# Patient Record
Sex: FEMALE | Race: WHITE | NOT HISPANIC OR LATINO | URBAN - METROPOLITAN AREA
[De-identification: names, ages, dates, MRNs, and addresses within clinical notes are randomized per-mention and may not be internally consistent; named-entity substitution may affect disease eponyms.]

---

## 2017-01-18 RX ORDER — TOPIRAMATE 50 MG/1
TABLET, FILM COATED ORAL
Qty: 180 TABLET | Refills: 0 | Status: SHIPPED | OUTPATIENT
Start: 2017-01-18 | End: 2017-04-03 | Stop reason: SDUPTHER

## 2017-04-03 RX ORDER — TOPIRAMATE 50 MG/1
TABLET, FILM COATED ORAL
Qty: 180 TABLET | Refills: 5 | Status: SHIPPED | OUTPATIENT
Start: 2017-04-03 | End: 2020-08-21

## 2017-09-01 DIAGNOSIS — R56.9 SEIZURES (HCC): ICD-10-CM

## 2017-09-01 DIAGNOSIS — G43.719 INTRACTABLE CHRONIC MIGRAINE WITHOUT AURA AND WITHOUT STATUS MIGRAINOSUS: Primary | ICD-10-CM

## 2024-01-03 ENCOUNTER — HOSPITAL ENCOUNTER (EMERGENCY)
Facility: HOSPITAL | Age: 64
Discharge: HOME | End: 2024-01-03
Attending: EMERGENCY MEDICINE | Admitting: EMERGENCY MEDICINE
Payer: COMMERCIAL

## 2024-01-03 VITALS
TEMPERATURE: 98.4 F | HEART RATE: 99 BPM | RESPIRATION RATE: 18 BRPM | SYSTOLIC BLOOD PRESSURE: 137 MMHG | HEIGHT: 63 IN | OXYGEN SATURATION: 95 % | BODY MASS INDEX: 26.58 KG/M2 | DIASTOLIC BLOOD PRESSURE: 63 MMHG | WEIGHT: 150 LBS

## 2024-01-03 DIAGNOSIS — J02.0 STREP PHARYNGITIS: Primary | ICD-10-CM

## 2024-01-03 LAB
ALBUMIN SERPL-MCNC: 3.8 G/DL (ref 3.5–5.2)
ALBUMIN/GLOB SERPL: 1.6 G/DL
ALP SERPL-CCNC: 91 U/L (ref 39–117)
ALT SERPL W P-5'-P-CCNC: 12 U/L (ref 1–33)
ANION GAP SERPL CALCULATED.3IONS-SCNC: 8.4 MMOL/L (ref 5–15)
AST SERPL-CCNC: 16 U/L (ref 1–32)
BASOPHILS # BLD AUTO: 0.03 10*3/MM3 (ref 0–0.2)
BASOPHILS NFR BLD AUTO: 0.3 % (ref 0–1.5)
BILIRUB SERPL-MCNC: 0.4 MG/DL (ref 0–1.2)
BILIRUB UR QL STRIP: NEGATIVE
BUN SERPL-MCNC: 6 MG/DL (ref 8–23)
BUN/CREAT SERPL: 8.2 (ref 7–25)
CALCIUM SPEC-SCNC: 9 MG/DL (ref 8.6–10.5)
CHLORIDE SERPL-SCNC: 106 MMOL/L (ref 98–107)
CLARITY UR: ABNORMAL
CO2 SERPL-SCNC: 22.6 MMOL/L (ref 22–29)
COLOR UR: YELLOW
CREAT SERPL-MCNC: 0.73 MG/DL (ref 0.57–1)
D-LACTATE SERPL-SCNC: 0.7 MMOL/L (ref 0.5–2)
DEPRECATED RDW RBC AUTO: 51.2 FL (ref 37–54)
EGFRCR SERPLBLD CKD-EPI 2021: 93.1 ML/MIN/1.73
EOSINOPHIL # BLD AUTO: 0.06 10*3/MM3 (ref 0–0.4)
EOSINOPHIL NFR BLD AUTO: 0.6 % (ref 0.3–6.2)
ERYTHROCYTE [DISTWIDTH] IN BLOOD BY AUTOMATED COUNT: 17.4 % (ref 12.3–15.4)
GLOBULIN UR ELPH-MCNC: 2.4 GM/DL
GLUCOSE SERPL-MCNC: 102 MG/DL (ref 65–99)
GLUCOSE UR STRIP-MCNC: NEGATIVE MG/DL
HCT VFR BLD AUTO: 37.7 % (ref 34–46.6)
HGB BLD-MCNC: 11.7 G/DL (ref 12–15.9)
HGB UR QL STRIP.AUTO: NEGATIVE
HOLD SPECIMEN: NORMAL
HOLD SPECIMEN: NORMAL
IMM GRANULOCYTES # BLD AUTO: 0.07 10*3/MM3 (ref 0–0.05)
IMM GRANULOCYTES NFR BLD AUTO: 0.7 % (ref 0–0.5)
KETONES UR QL STRIP: NEGATIVE
LEUKOCYTE ESTERASE UR QL STRIP.AUTO: NEGATIVE
LIPASE SERPL-CCNC: 25 U/L (ref 13–60)
LYMPHOCYTES # BLD AUTO: 1.14 10*3/MM3 (ref 0.7–3.1)
LYMPHOCYTES NFR BLD AUTO: 11.9 % (ref 19.6–45.3)
MCH RBC QN AUTO: 25 PG (ref 26.6–33)
MCHC RBC AUTO-ENTMCNC: 31 G/DL (ref 31.5–35.7)
MCV RBC AUTO: 80.6 FL (ref 79–97)
MONOCYTES # BLD AUTO: 0.63 10*3/MM3 (ref 0.1–0.9)
MONOCYTES NFR BLD AUTO: 6.6 % (ref 5–12)
NEUTROPHILS NFR BLD AUTO: 7.66 10*3/MM3 (ref 1.7–7)
NEUTROPHILS NFR BLD AUTO: 79.9 % (ref 42.7–76)
NITRITE UR QL STRIP: NEGATIVE
NRBC BLD AUTO-RTO: 0 /100 WBC (ref 0–0.2)
PH UR STRIP.AUTO: 6 [PH] (ref 4.5–8)
PLATELET # BLD AUTO: 310 10*3/MM3 (ref 140–450)
PMV BLD AUTO: 9.5 FL (ref 6–12)
POTASSIUM SERPL-SCNC: 3.9 MMOL/L (ref 3.5–5.2)
PROT SERPL-MCNC: 6.2 G/DL (ref 6–8.5)
PROT UR QL STRIP: NEGATIVE
RBC # BLD AUTO: 4.68 10*6/MM3 (ref 3.77–5.28)
S PYO AG THROAT QL: POSITIVE
SODIUM SERPL-SCNC: 137 MMOL/L (ref 136–145)
SP GR UR STRIP: 1.02 (ref 1–1.03)
UROBILINOGEN UR QL STRIP: ABNORMAL
WBC NRBC COR # BLD AUTO: 9.59 10*3/MM3 (ref 3.4–10.8)
WHOLE BLOOD HOLD COAG: NORMAL
WHOLE BLOOD HOLD SPECIMEN: NORMAL

## 2024-01-03 PROCEDURE — 99283 EMERGENCY DEPT VISIT LOW MDM: CPT

## 2024-01-03 PROCEDURE — 25810000003 SODIUM CHLORIDE 0.9 % SOLUTION: Performed by: EMERGENCY MEDICINE

## 2024-01-03 PROCEDURE — 81003 URINALYSIS AUTO W/O SCOPE: CPT

## 2024-01-03 PROCEDURE — 80053 COMPREHEN METABOLIC PANEL: CPT | Performed by: EMERGENCY MEDICINE

## 2024-01-03 PROCEDURE — 83605 ASSAY OF LACTIC ACID: CPT | Performed by: EMERGENCY MEDICINE

## 2024-01-03 PROCEDURE — 85025 COMPLETE CBC W/AUTO DIFF WBC: CPT

## 2024-01-03 PROCEDURE — 96360 HYDRATION IV INFUSION INIT: CPT

## 2024-01-03 PROCEDURE — 87880 STREP A ASSAY W/OPTIC: CPT | Performed by: EMERGENCY MEDICINE

## 2024-01-03 PROCEDURE — 25010000002 PENICILLIN G BENZATHINE PER 1200000 UNITS: Performed by: EMERGENCY MEDICINE

## 2024-01-03 PROCEDURE — 96361 HYDRATE IV INFUSION ADD-ON: CPT

## 2024-01-03 PROCEDURE — 96372 THER/PROPH/DIAG INJ SC/IM: CPT

## 2024-01-03 PROCEDURE — 83690 ASSAY OF LIPASE: CPT | Performed by: EMERGENCY MEDICINE

## 2024-01-03 RX ORDER — SODIUM CHLORIDE 0.9 % (FLUSH) 0.9 %
10 SYRINGE (ML) INJECTION AS NEEDED
Status: DISCONTINUED | OUTPATIENT
Start: 2024-01-03 | End: 2024-01-04 | Stop reason: HOSPADM

## 2024-01-03 RX ADMIN — SODIUM CHLORIDE 1000 ML: 9 INJECTION, SOLUTION INTRAVENOUS at 19:25

## 2024-01-03 RX ADMIN — PENICILLIN G BENZATHINE 1.2 MILLION UNITS: 1200000 INJECTION, SUSPENSION INTRAMUSCULAR at 21:20

## 2024-01-04 NOTE — DISCHARGE INSTRUCTIONS
Rest.  Drink plenty of fluids.  Follow-up with Brittany Obrein at week.  Return to the emergency department if there is worsening pain, bloody stools, shortness of breath, difficulty swallowing or speaking, worse in any way at all.

## 2024-01-04 NOTE — ED PROVIDER NOTES
Subjective   History of Present Illness    Chief complaint: Fever chills and diarrhea sore throat    Location: Home    Quality/Severity: Nonbloody    Timing/Onset/Duration: Christmas    Modifying Factors: Patient thinks overall she is getting better but she feels dehydrated    Associated Symptoms: Patient plaints of headache.  Patient's had fever and chills.  No cough.  Patient's had sore throat.  No earache or nasal congestion.  No chest pain or shortness of breath.  She had been having lower abdominal pain but she is not having that now.  No blood in the diarrhea.  No nausea or vomiting.    Narrative: This 63-year-old presents with abdominal pain that started on Justyn Day.  Patient presents with sore throat and diarrhea.  She has been to PCP several times with x-ray, stool cultures and blood work with no source of infection.  COVID flu on 27 December 2023 was negative.  Do not have access to the stool studies or the KUB results that were apparently done in the [facility] system.    PCP:Brittany Obrien MD      Review of Systems   Constitutional:  Positive for chills and fever (Subjective).   HENT:  Positive for sore throat. Negative for congestion and ear pain.    Respiratory:  Negative for cough.    Cardiovascular:  Negative for chest pain.   Gastrointestinal:  Positive for diarrhea. Negative for abdominal pain, nausea and vomiting.   Genitourinary:  Negative for difficulty urinating.   Musculoskeletal:  Negative for back pain.   Skin:  Negative for rash.   Neurological:  Positive for headaches.         Past Medical History:   Diagnosis Date   • Depression    • Diabetes mellitus, type II    • History of PCOS    • History of shingles 2005   • Hyperlipidemia    • Hypertension    • Migraine    • Pap smear of cervix with ASCUS, cannot exclude HGSIL 11/05/2007 11/5/07; 1/8/09; 7/9/09       Allergies   Allergen Reactions   • Bupropion Other (See Comments) and Diarrhea       Past Surgical History:   Procedure Laterality  Date   • D & C HYSTEROSCOPY ENDOMETRIAL ABLATION  06/01/2010    W/KATHIE. DR[facility] DAVID HERNDON/[facility].   • ENDOMETRIAL BIOPSY  12/03/2007    BENIGN   • GASTROTOMY  05/2004   • TONSILLECTOMY  1967   • TUBAL ABDOMINAL LIGATION  1986       Family History   Problem Relation Age of Onset   • Breast cancer Mother 72   • Stroke Mother    • Hypertension Mother    • Heart disease Mother    • No Known Problems Father        Social History     Socioeconomic History   • Marital status:      Spouse name: GOSIA   • Number of children: 2   Tobacco Use   • Smoking status: Never   • Smokeless tobacco: Never   Vaping Use   • Vaping Use: Never used   Substance and Sexual Activity   • Alcohol use: No   • Drug use: No   • Sexual activity: Yes     Partners: Male     Birth control/protection: Surgical     Comment: BTL 1986           Objective   Physical Exam  Vitals (Temperature 98.4 °F, pulse 100, respirations 20, /82, room air pulse ox 98%.) and nursing note reviewed.   Constitutional:       Appearance: She is well-developed.   HENT:      Head: Normocephalic and atraumatic.      Mouth/Throat:      Comments: Mucous membranes dry  Cardiovascular:      Rate and Rhythm: Normal rate and regular rhythm.      Heart sounds: Murmur heard.      No friction rub. No gallop.   Pulmonary:      Effort: Pulmonary effort is normal.      Breath sounds: Normal breath sounds.   Abdominal:      General: Abdomen is flat. Bowel sounds are normal.      Palpations: Abdomen is soft. There is no mass.      Tenderness: There is no abdominal tenderness. There is no guarding or rebound.      Hernia: No hernia is present.   Skin:     General: Skin is warm and dry.      Findings: No rash.   Neurological:      General: No focal deficit present.      Mental Status: She is alert and oriented to person, place, and time.         Procedures           ED Course  ED Course as of 01/04/24 0014   Wed Jan 03, 2024   1904 Urinalysis is slightly cloudy, is unremarkable. [RC]    1905 The hemoglobin is 11.7, neutrophil percent 79%, absolute neutrophil count 7.6, CBC otherwise unremarkable. [RC]   1905 The hemoglobin was 10.7 yesterday, hemoglobin is actually better today. [RC]   1911 Urinalysis is slightly cloudy, otherwise unremarkable. [RC]   2059 Patient strep a is positive. [RC]   2100 The glucose  is 102, the CMP is otherwise unremarkable. [RC]   2100 Lipase is 25 and normal. [RC]   2100 Lactic acid is 0.7 and normal. [RC]      ED Course User Index  [RC] Satish Yu MD      21:06 EST, 01/03/24:  The patient was reassessed.  She feels much better.  Her vital signs reviewed and are stable.  Exam: Soft, nontender, no masses, positive bowel sounds.    21:06 EST, 01/03/24:  Patient's diagnosis of strep pharyngitis and enteritis was discussed with her.  She should rest.  She should drink plenty of fluids.  We will give her an IM injection of penicillin here in the emergency department.  Patient should follow-up with Brittany Obrien in the office next week.  She return to the emergency department if there is bloody stools, worsening pain, shortness of breath, difficulty swallowing or speaking, worse in any way at all.  All the patient questions were answered she will be discharged in good condition.                                       Medical Decision Making  Amount and/or Complexity of Data Reviewed  Labs: ordered.    Risk  Prescription drug management.        Final diagnoses:   Strep pharyngitis       ED Disposition  ED Disposition       None            No follow-up provider specified.       Medication List      No changes were made to your prescriptions during this visit.            Satish Yu MD  01/04/24 0014

## 2024-01-09 ENCOUNTER — HOSPITAL ENCOUNTER (EMERGENCY)
Facility: HOSPITAL | Age: 64
Discharge: HOME | End: 2024-01-09
Attending: EMERGENCY MEDICINE | Admitting: EMERGENCY MEDICINE
Payer: COMMERCIAL

## 2024-01-09 VITALS
SYSTOLIC BLOOD PRESSURE: 142 MMHG | TEMPERATURE: 97.7 F | RESPIRATION RATE: 20 BRPM | DIASTOLIC BLOOD PRESSURE: 88 MMHG | WEIGHT: 150 LBS | OXYGEN SATURATION: 99 % | HEART RATE: 80 BPM | HEIGHT: 63 IN | BODY MASS INDEX: 26.58 KG/M2

## 2024-01-09 DIAGNOSIS — A04.5 CAMPYLOBACTER DIARRHEA: Primary | ICD-10-CM

## 2024-01-09 LAB
ALBUMIN SERPL-MCNC: 3.9 G/DL (ref 3.5–5.2)
ALBUMIN/GLOB SERPL: 1.3 G/DL
ALP SERPL-CCNC: 85 U/L (ref 39–117)
ALT SERPL W P-5'-P-CCNC: 15 U/L (ref 1–33)
ANION GAP SERPL CALCULATED.3IONS-SCNC: 9.3 MMOL/L (ref 5–15)
AST SERPL-CCNC: 24 U/L (ref 1–32)
BASOPHILS # BLD AUTO: 0.06 10*3/MM3 (ref 0–0.2)
BASOPHILS NFR BLD AUTO: 1.2 % (ref 0–1.5)
BILIRUB SERPL-MCNC: 0.3 MG/DL (ref 0–1.2)
BILIRUB UR QL STRIP: NEGATIVE
BUN SERPL-MCNC: 4 MG/DL (ref 8–23)
BUN/CREAT SERPL: 4.6 (ref 7–25)
CALCIUM SPEC-SCNC: 9.5 MG/DL (ref 8.6–10.5)
CHLORIDE SERPL-SCNC: 104 MMOL/L (ref 98–107)
CLARITY UR: CLEAR
CO2 SERPL-SCNC: 20.7 MMOL/L (ref 22–29)
COLOR UR: NORMAL
CREAT SERPL-MCNC: 0.87 MG/DL (ref 0.57–1)
DEPRECATED RDW RBC AUTO: 49.9 FL (ref 37–54)
EGFRCR SERPLBLD CKD-EPI 2021: 75.4 ML/MIN/1.73
EOSINOPHIL # BLD AUTO: 0.08 10*3/MM3 (ref 0–0.4)
EOSINOPHIL NFR BLD AUTO: 1.5 % (ref 0.3–6.2)
ERYTHROCYTE [DISTWIDTH] IN BLOOD BY AUTOMATED COUNT: 17.1 % (ref 12.3–15.4)
GLOBULIN UR ELPH-MCNC: 2.9 GM/DL
GLUCOSE SERPL-MCNC: 79 MG/DL (ref 65–99)
GLUCOSE UR STRIP-MCNC: NEGATIVE MG/DL
HCT VFR BLD AUTO: 40.8 % (ref 34–46.6)
HGB BLD-MCNC: 12.5 G/DL (ref 12–15.9)
HGB UR QL STRIP.AUTO: NEGATIVE
IMM GRANULOCYTES # BLD AUTO: 0.06 10*3/MM3 (ref 0–0.05)
IMM GRANULOCYTES NFR BLD AUTO: 1.2 % (ref 0–0.5)
KETONES UR QL STRIP: NEGATIVE
LEUKOCYTE ESTERASE UR QL STRIP.AUTO: NEGATIVE
LYMPHOCYTES # BLD AUTO: 2.13 10*3/MM3 (ref 0.7–3.1)
LYMPHOCYTES NFR BLD AUTO: 41 % (ref 19.6–45.3)
MCH RBC QN AUTO: 24.6 PG (ref 26.6–33)
MCHC RBC AUTO-ENTMCNC: 30.6 G/DL (ref 31.5–35.7)
MCV RBC AUTO: 80.2 FL (ref 79–97)
MONOCYTES # BLD AUTO: 0.45 10*3/MM3 (ref 0.1–0.9)
MONOCYTES NFR BLD AUTO: 8.7 % (ref 5–12)
NEUTROPHILS NFR BLD AUTO: 2.42 10*3/MM3 (ref 1.7–7)
NEUTROPHILS NFR BLD AUTO: 46.4 % (ref 42.7–76)
NITRITE UR QL STRIP: NEGATIVE
NRBC BLD AUTO-RTO: 0 /100 WBC (ref 0–0.2)
PH UR STRIP.AUTO: 5.5 [PH] (ref 4.5–8)
PLATELET # BLD AUTO: 411 10*3/MM3 (ref 140–450)
PMV BLD AUTO: 10.2 FL (ref 6–12)
POTASSIUM SERPL-SCNC: 3.3 MMOL/L (ref 3.5–5.2)
PROT SERPL-MCNC: 6.8 G/DL (ref 6–8.5)
PROT UR QL STRIP: NEGATIVE
RBC # BLD AUTO: 5.09 10*6/MM3 (ref 3.77–5.28)
SODIUM SERPL-SCNC: 134 MMOL/L (ref 136–145)
SP GR UR STRIP: <=1.005 (ref 1–1.03)
UROBILINOGEN UR QL STRIP: NORMAL
WBC NRBC COR # BLD AUTO: 5.2 10*3/MM3 (ref 3.4–10.8)

## 2024-01-09 PROCEDURE — 80053 COMPREHEN METABOLIC PANEL: CPT

## 2024-01-09 PROCEDURE — 99283 EMERGENCY DEPT VISIT LOW MDM: CPT

## 2024-01-09 PROCEDURE — 81003 URINALYSIS AUTO W/O SCOPE: CPT

## 2024-01-09 PROCEDURE — 96360 HYDRATION IV INFUSION INIT: CPT

## 2024-01-09 PROCEDURE — 25810000003 SODIUM CHLORIDE 0.9 % SOLUTION

## 2024-01-09 PROCEDURE — 85025 COMPLETE CBC W/AUTO DIFF WBC: CPT

## 2024-01-09 RX ORDER — POTASSIUM CHLORIDE 20 MEQ/1
40 TABLET, EXTENDED RELEASE ORAL DAILY
Status: DISCONTINUED | OUTPATIENT
Start: 2024-01-09 | End: 2024-01-09 | Stop reason: HOSPADM

## 2024-01-09 RX ORDER — ONDANSETRON 4 MG/1
8 TABLET, ORALLY DISINTEGRATING ORAL EVERY 8 HOURS PRN
Qty: 12 TABLET | Refills: 0 | Status: SHIPPED | OUTPATIENT
Start: 2024-01-09

## 2024-01-09 RX ORDER — SODIUM CHLORIDE 0.9 % (FLUSH) 0.9 %
10 SYRINGE (ML) INJECTION AS NEEDED
Status: DISCONTINUED | OUTPATIENT
Start: 2024-01-09 | End: 2024-01-09 | Stop reason: HOSPADM

## 2024-01-09 RX ORDER — AZITHROMYCIN 500 MG/1
500 TABLET, FILM COATED ORAL DAILY
Qty: 3 TABLET | Refills: 0 | Status: SHIPPED | OUTPATIENT
Start: 2024-01-09 | End: 2024-01-12

## 2024-01-09 RX ADMIN — POTASSIUM CHLORIDE 40 MEQ: 1500 TABLET, EXTENDED RELEASE ORAL at 17:51

## 2024-01-09 RX ADMIN — SODIUM CHLORIDE 1000 ML: 9 INJECTION, SOLUTION INTRAVENOUS at 16:25

## 2024-01-09 NOTE — DISCHARGE INSTRUCTIONS
Stop ciprofloxacin.  Start azithromycin 500 mg once daily x 3 days.  Also recommend taking a probiotic and vitamin.  Continue use of Imodium 2 mg 4 times daily.  Increase electrolyte fluids and small bland meals.  Trial adding a bulking agent, such as Metamucil.  Follow-up with your primary care and/or GI for ongoing evaluation management.  I have sent in a GI referral.  I have attached the phone number to the [facility] office.  Return to emergency department for worsening symptoms or other medical emergencies.  Refer to the attached instructions for further information.

## 2024-01-09 NOTE — Clinical Note
[facility]  [facility] EMERGENCY DEPARTMENT  [address] [place] [address]  Phone: [phone]    Emma Villavicencio was seen and treated in our emergency department on 1/9/2024.  She may return to work on 01/15/2024.         Thank you for choosing [facility].    Regi Bailon, ROLO

## 2024-01-09 NOTE — ED NOTES
Patient states she has been having multiple episodes of diarrhea since December, patient states that the diarrhea has gotten worse and more frequent. Patient denies abd pain, pt denies any bloody stools. Patient states that diarrhea occurs more often after meals. Patient placed in hospital gown, patient belongings in bag, patient has call light within reach. PA at the bedside to evaluate patient and discuss plan of care

## 2024-01-09 NOTE — Clinical Note
RUTH CREWS  [facility] EMERGENCY DEPARTMENT  [address] [place] [address]  Phone: [phone]    Emma Villavicencio was seen and treated in our emergency department on 1/9/2024.  She may return to work on 01/15/2024.         Thank you for choosing [facility].    Regi Bailon, ROLO

## 2024-01-09 NOTE — ED PROVIDER NOTES
"Subjective   History of Present Illness  Patient is a 63-year-old female who presents to the emergency department for diarrhea x 2 weeks.  Patient reports fever at the onset of her symptoms, but none in the past week.  Denies bloody diarrhea, abdominal pain, nausea, vomiting.  Patient has been previously seen by her primary care.  Had a stool culture which revealed Campylobacter bacteria.  Was put on ciprofloxacin 500 twice daily for 10 days, which she is still taking.  Patient reports it has not helped.  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  Reports she has had very little to eat yesterday and today.  Was also recently diagnosed with strep throat, and given single IM injection in this ER x 6 days ago.        Review of Systems   Constitutional:  Positive for appetite change. Negative for chills, diaphoresis, fatigue and fever.   Respiratory:  Negative for cough and shortness of breath.    Cardiovascular:  Negative for chest pain.   Gastrointestinal:  Positive for diarrhea. Negative for abdominal pain, anal bleeding, blood in stool, constipation, nausea and vomiting.   Genitourinary:  Negative for difficulty urinating and dysuria.   Skin:  Negative for color change, pallor, rash and wound.   Neurological:  Negative for dizziness, seizures, syncope, weakness and light-headedness.   Psychiatric/Behavioral:  Negative for confusion.        Past Medical History:   Diagnosis Date    Depression     Diabetes mellitus, type II     History of PCOS     History of shingles 2005    Hyperlipidemia     Hypertension     Migraine     Pap smear of cervix with ASCUS, cannot exclude HGSIL 11/05/2007 11/5/07; 1/8/09; 7/9/09       Allergies   Allergen Reactions    Bupropion Other (See Comments) and Diarrhea       Past Surgical History:   Procedure Laterality Date    D & C HYSTEROSCOPY ENDOMETRIAL ABLATION  06/01/2010    W/KATHIE. DR. MOLINA. " [facility].    ENDOMETRIAL BIOPSY  12/03/2007    BENIGN    GASTROTOMY  05/2004    TONSILLECTOMY  1967    TUBAL ABDOMINAL LIGATION  1986       Family History   Problem Relation Age of Onset    Breast cancer Mother 72    Stroke Mother     Hypertension Mother     Heart disease Mother     No Known Problems Father        Social History     Socioeconomic History    Marital status:      Spouse name: GOSIA    Number of children: 2   Tobacco Use    Smoking status: Never    Smokeless tobacco: Never   Vaping Use    Vaping Use: Never used   Substance and Sexual Activity    Alcohol use: No    Drug use: No    Sexual activity: Yes     Partners: Male     Birth control/protection: Surgical     Comment: BTL 1986           Objective   Physical Exam  Vitals and nursing note reviewed.   Constitutional:       General: She is not in acute distress.     Appearance: She is normal weight. She is not ill-appearing, toxic-appearing or diaphoretic.   Pulmonary:      Effort: Pulmonary effort is normal. No respiratory distress.   Abdominal:      General: Abdomen is flat. There is no distension.      Palpations: Abdomen is soft. There is no mass.      Tenderness: There is no abdominal tenderness. There is no guarding or rebound.      Hernia: No hernia is present.   Skin:     General: Skin is warm and dry.   Neurological:      General: No focal deficit present.      Mental Status: She is alert.   Psychiatric:         Mood and Affect: Mood normal.         Behavior: Behavior normal.         Procedures           ED Course  ED Course as of 01/09/24 1756 Tue Jan 09, 2024   1742 WBC: 5.20 [AS]   1742 Neutrophil Rel %: 46.4 [AS]   1742 Neutrophils Absolute: 2.42 [AS]   1742 Hemoglobin: 12.5 [AS]   1742 Hematocrit: 40.8 [AS]   1742 MCV: 80.2 [AS]   1742 Glucose: 79 [AS]   1742 BUN(!): 4 [AS]   1742 Creatinine: 0.87 [AS]   1742 Sodium(!): 134 [AS]   1742 Potassium(!): 3.3 [AS]   1742 Chloride: 104 [AS]   1742 CO2(!): 20.7 [AS]   1742 ALT (SGPT): 15  [AS]   1742 AST (SGOT): 24 [AS]   1742 Alkaline Phosphatase: 85 [AS]   1742 BUN/Creatinine Ratio(!): 4.6 [AS]   1742 Anion Gap: 9.3 [AS]   1742 eGFR: 75.4 [AS]   1742 Clear urinalysis. [AS]   1742 BP: 143/84 [AS]   1742 Temp: 97.7 °F (36.5 °C) [AS]   1742 Heart Rate: 83 [AS]   1743 Resp: 20 [AS]   1743 SpO2: 99 % [AS]      ED Course User Index  [AS] Regi Bailon PA-C                                             Medical Decision Making  Patient is a 63-year-old female who presents for diarrhea x 2 weeks.  Known diagnosis of Campylobacter.  Started on ciprofloxacin by PCP.  These were both through outside facility.  Reviewed the records on patient's phone with her permission.  Patient overall appears well, no acute distress, nontoxic.  Vital signs are WNL.  Afebrile.  Abdomen soft, nontender.  Exam is unremarkable.  Patient's lab work reveals mild hyponatremia and hypokalemia.  Potassium given.  Patient taking multivitamin at home.  Remaining lab work unremarkable.  No white count.  Normal kidney function.  Urinalysis clear.  Patient has some chronic concerns about anemia and bleeding from several months ago.  Lab work is stable today.  Patient instructed to follow-up with PCP.  Patient likely on ciprofloxacin at too low of a dose, or with resistance.  Not known if susceptibility report was completed.  Will try azithromycin.  Discussed risk versus benefit of antibiotics, as they could be worsening her symptoms, and typically this is a self-limited condition especially when mild without bloody diarrhea, abdominal pain, or fever.  However, her symptoms have been going on for 2 weeks, and she would like to try azithromycin.  She verbalized understanding.  Referral placed for GI follow-up.  Discussed when to return to the emergency department.  Answered all questions.  Patient verbalized understanding and was agreeable to plan and discharge.  My differential diagnosis includes but is not limited to: Infectious diarrhea,  viral diarrhea, dehydration, electrolyte abnormality, PHILIP, ARF, IBS, diverticulitis, UTI, cystitis, gastroenteritis, gastritis, hepatitis, pancreatitis    Problems Addressed:  Campylobacter diarrhea: complicated acute illness or injury    Amount and/or Complexity of Data Reviewed  Labs: ordered.    Risk  Prescription drug management.        Final diagnoses:   Campylobacter diarrhea       ED Disposition  ED Disposition       ED Disposition   Discharge    Condition   Stable    Comment   --               Carri, Martin Yun MD  [address]  [facility] [id number] [phone]          Manolo Hurley MD  [address]  [facility] [address]  [phone]          Manolo Hurley MD  [address]  [facility] [address]  [phone]               Medication List        New Prescriptions      azithromycin 500 MG tablet  Commonly known as: ZITHROMAX  Take 1 tablet by mouth Daily for 3 days.     ondansetron ODT 4 MG disintegrating tablet  Commonly known as: ZOFRAN-ODT  Place 2 tablets on the tongue Every 8 (Eight) Hours As Needed for Nausea or Vomiting.               Where to Get Your Medications        These medications were sent to [facility] [id number] - [place] - [address] - [phone]  - [phone]   [address] [address]      Phone: [phone]   azithromycin 500 MG tablet  ondansetron ODT 4 MG disintegrating tablet            Regi Bailon PA-C  01/09/24 2368

## 2024-01-11 PROBLEM — D50.9 IRON (FE) DEFICIENCY ANEMIA: Status: ACTIVE | Noted: 2024-01-11

## 2024-01-11 PROBLEM — A04.5 CAMPYLOBACTER DIARRHEA: Status: ACTIVE | Noted: 2024-01-11

## 2024-01-12 ENCOUNTER — OFFICE VISIT (OUTPATIENT)
Dept: GASTROENTEROLOGY | Facility: CLINIC | Age: 64
End: 2024-01-12
Payer: COMMERCIAL

## 2024-01-12 ENCOUNTER — TELEPHONE (OUTPATIENT)
Dept: GASTROENTEROLOGY | Facility: CLINIC | Age: 64
End: 2024-01-12

## 2024-01-12 VITALS
SYSTOLIC BLOOD PRESSURE: 138 MMHG | HEIGHT: 63 IN | DIASTOLIC BLOOD PRESSURE: 66 MMHG | WEIGHT: 147 LBS | BODY MASS INDEX: 26.05 KG/M2

## 2024-01-12 DIAGNOSIS — D50.9 IRON DEFICIENCY ANEMIA, UNSPECIFIED IRON DEFICIENCY ANEMIA TYPE: ICD-10-CM

## 2024-01-12 DIAGNOSIS — A04.5 CAMPYLOBACTER DIARRHEA: ICD-10-CM

## 2024-01-12 DIAGNOSIS — K58.0 IRRITABLE BOWEL SYNDROME WITH DIARRHEA: Primary | ICD-10-CM

## 2024-01-12 RX ORDER — ELUXADOLINE 100 MG/1
100 TABLET, FILM COATED ORAL 2 TIMES DAILY
Qty: 60 TABLET | Refills: 5 | Status: SHIPPED | OUTPATIENT
Start: 2024-01-12

## 2024-01-12 NOTE — ASSESSMENT & PLAN NOTE
- Treated with Levaquin followed by a 3 day course of Azithromycin 500 mg QD. Diarrhea remains persistent despite completing antibiotics

## 2024-01-12 NOTE — PROGRESS NOTES
Emma Villavicencio is a 63 y.o. female with a past medical history of BERENICE, T2DM, PCOS + noted below who presents for evaluation of   Chief Complaint   Patient presents with    Diarrhea       Subjective     # Persistent Diarrhea   # Campylobacter jejuni infection   - Diagnosed via stool culture on 12/29 at [facility]. Started on 10 day course of Ciprofloxacin with minimal relief.   - Seen in [facility] ER on 1/9 for persistent diarrhea ongoing for 2 weeks. Discharged on a 3 day course of Azithromycin 500 mg QD.   - During encounter, reports persistent diarrhea despite finishing Azithromycin.   - Reports having 5 to 6 watery bowel movements per day.   - Denies associated abdominal pain.     # BERENICE  - Noted patient's Hgb has remained stable at 10 over the course of last year.   - Had iron studies in 10/2023 notable for T Saturation of 7%.   - Reports dark tarry stool occurring once per month. Reports taking PO iron daily.   - EGD in 8/2023 with Dr. Marlon Maya showed evidence of gastrojejunal bypass. C/s in 5/2022 had a sub-cm TA removed. Previously instructed to repeat c/s in 5/2027.              Past Medical History:   Diagnosis Date    Depression     Diabetes mellitus, type II     History of PCOS     History of shingles 2005    Hyperlipidemia     Hypertension     Migraine     Pap smear of cervix with ASCUS, cannot exclude HGSIL 11/05/2007 11/5/07; 1/8/09; 7/9/09         Current Outpatient Medications:     desvenlafaxine (PRISTIQ) 100 MG 24 hr tablet, , Disp: , Rfl:     desvenlafaxine (PRISTIQ) 50 MG 24 hr tablet, , Disp: , Rfl:     Estradiol-Norethindrone Acet 0.5-0.1 MG per tablet, Take 1 tablet by mouth Daily., Disp: , Rfl:     ferrous sulfate 325 (65 FE) MG tablet, Take  by mouth daily., Disp: , Rfl:     levothyroxine (SYNTHROID, LEVOTHROID) 150 MCG tablet, Take 1 tablet by mouth Daily., Disp: , Rfl:     OLANZapine (zyPREXA) 2.5 MG tablet, , Disp: , Rfl:     ondansetron ODT (ZOFRAN-ODT) 4 MG  disintegrating tablet, Place 2 tablets on the tongue Every 8 (Eight) Hours As Needed for Nausea or Vomiting., Disp: 12 tablet, Rfl: 0    pravastatin (PRAVACHOL) 10 MG tablet, Take 1 tablet by mouth Every Night., Disp: , Rfl:     topiramate (TOPAMAX) 100 MG tablet, Take 1 tablet by mouth Daily., Disp: , Rfl:     traZODone (DESYREL) 100 MG tablet, , Disp: , Rfl:     Trulicity 3 MG/0.5ML solution pen-injector, Inject 0.5 mL under the skin into the appropriate area as directed., Disp: , Rfl:     Eluxadoline (Viberzi) 100 MG tablet, Take 1 tablet by mouth 2 (Two) Times a Day., Disp: 60 tablet, Rfl: 5    metFORMIN (GLUCOPHAGE) 1000 MG tablet, Take 1 tablet by mouth., Disp: , Rfl:     pioglitazone (ACTOS) 30 MG tablet, Take 1 tablet by mouth Daily., Disp: , Rfl:     Current Facility-Administered Medications:     cyanocobalamin injection 1,000 mcg, 1,000 mcg, Intramuscular, Q28 Days, Brittany Obrien MD, 1,000 mcg at 03/17/16 1125    Allergies   Allergen Reactions    Bupropion Other (See Comments) and Diarrhea       Social History     Socioeconomic History    Marital status:      Spouse name: GOSIA    Number of children: 2   Tobacco Use    Smoking status: Never    Smokeless tobacco: Never   Vaping Use    Vaping Use: Never used   Substance and Sexual Activity    Alcohol use: No    Drug use: No    Sexual activity: Yes     Partners: Male     Birth control/protection: Surgical     Comment: BTL 1986       Family History   Problem Relation Age of Onset    Breast cancer Mother 72    Stroke Mother     Hypertension Mother     Heart disease Mother     No Known Problems Father        Objective     Vitals:    01/12/24 0953   BP: 138/66         01/12/24  0953   Weight: 66.7 kg (147 lb)     Body mass index is 26.05 kg/m².    Physical Exam  Constitutional:       Appearance: Normal appearance.   HENT:      Head: Normocephalic and atraumatic.   Eyes:      Extraocular Movements: Extraocular movements intact.      Conjunctiva/sclera:  Conjunctivae normal.   Cardiovascular:      Rate and Rhythm: Normal rate and regular rhythm.      Heart sounds: Normal heart sounds.   Pulmonary:      Effort: Pulmonary effort is normal.      Breath sounds: Normal breath sounds.   Abdominal:      General: Abdomen is flat. Bowel sounds are normal. There is no distension.      Palpations: Abdomen is soft.      Tenderness: There is no abdominal tenderness.   Neurological:      Mental Status: She is alert.   Psychiatric:         Mood and Affect: Mood normal.         Behavior: Behavior normal.         WBC   Date Value Ref Range Status   01/09/2024 5.20 3.40 - 10.80 10*3/mm3 Final   01/02/2024 8.35 4.5 - 11.0 10*3/uL Final     RBC   Date Value Ref Range Status   01/09/2024 5.09 3.77 - 5.28 10*6/mm3 Final   01/02/2024 4.45 4.0 - 5.2 10*6/uL Final     Hemoglobin   Date Value Ref Range Status   01/09/2024 12.5 12.0 - 15.9 g/dL Final   01/02/2024 10.7 (L) 12.0 - 16.0 g/dL Final     Hematocrit   Date Value Ref Range Status   01/09/2024 40.8 34.0 - 46.6 % Final   01/02/2024 34.9 (L) 36.0 - 46.0 % Final     MCV   Date Value Ref Range Status   01/09/2024 80.2 79.0 - 97.0 fL Final   01/02/2024 78.4 (L) 80.0 - 100.0 fL Final     MCH   Date Value Ref Range Status   01/09/2024 24.6 (L) 26.6 - 33.0 pg Final   01/02/2024 24.0 (L) 26.0 - 34.0 pg Final     MCHC   Date Value Ref Range Status   01/09/2024 30.6 (L) 31.5 - 35.7 g/dL Final   01/02/2024 30.7 (L) 31.0 - 37.0 g/dL Final     RDW   Date Value Ref Range Status   01/09/2024 17.1 (H) 12.3 - 15.4 % Final   01/02/2024 17.3 (H) 12.0 - 16.8 % Final     RDW-SD   Date Value Ref Range Status   01/09/2024 49.9 37.0 - 54.0 fl Final     MPV   Date Value Ref Range Status   01/09/2024 10.2 6.0 - 12.0 fL Final   01/02/2024 10.6 8.4 - 12.4 fL Final     Platelets   Date Value Ref Range Status   01/09/2024 411 140 - 450 10*3/mm3 Final   01/02/2024 283 140 - 440 10*3/uL Final     Neutrophil Rel %   Date Value Ref Range Status   01/02/2024 77.4 45 -  80 % Final     Neutrophil %   Date Value Ref Range Status   01/09/2024 46.4 42.7 - 76.0 % Final     Lymphocyte Rel %   Date Value Ref Range Status   01/02/2024 13.5 (L) 15 - 50 % Final     Lymphocyte %   Date Value Ref Range Status   01/09/2024 41.0 19.6 - 45.3 % Final     Monocyte Rel %   Date Value Ref Range Status   01/02/2024 7.3 0 - 15 % Final     Monocyte %   Date Value Ref Range Status   01/09/2024 8.7 5.0 - 12.0 % Final     Eosinophil %   Date Value Ref Range Status   01/09/2024 1.5 0.3 - 6.2 % Final   01/02/2024 0.6 0 - 7 % Final     Basophil Rel %   Date Value Ref Range Status   01/02/2024 0.5 0 - 2 % Final     Basophil %   Date Value Ref Range Status   01/09/2024 1.2 0.0 - 1.5 % Final     Immature Grans %   Date Value Ref Range Status   01/09/2024 1.2 (H) 0.0 - 0.5 % Final   01/02/2024 0.7 0.0 - 1.0 % Final     Neutrophils Absolute   Date Value Ref Range Status   01/02/2024 6.46 2.0 - 8.8 10*3/uL Final     Neutrophils, Absolute   Date Value Ref Range Status   01/09/2024 2.42 1.70 - 7.00 10*3/mm3 Final     Lymphocytes Absolute   Date Value Ref Range Status   01/02/2024 1.13 0.7 - 5.5 10*3/uL Final     Lymphocytes, Absolute   Date Value Ref Range Status   01/09/2024 2.13 0.70 - 3.10 10*3/mm3 Final     Monocytes Absolute   Date Value Ref Range Status   01/02/2024 0.61 0.0 - 1.7 10*3/uL Final     Monocytes, Absolute   Date Value Ref Range Status   01/09/2024 0.45 0.10 - 0.90 10*3/mm3 Final     Eosinophils Absolute   Date Value Ref Range Status   01/02/2024 0.05 0.0 - 0.8 10*3/uL Final     Eosinophils, Absolute   Date Value Ref Range Status   01/09/2024 0.08 0.00 - 0.40 10*3/mm3 Final     Basophils Absolute   Date Value Ref Range Status   01/02/2024 0.04 0.0 - 0.2 10*3/uL Final     Basophils, Absolute   Date Value Ref Range Status   01/09/2024 0.06 0.00 - 0.20 10*3/mm3 Final     Immature Grans, Absolute   Date Value Ref Range Status   01/09/2024 0.06 (H) 0.00 - 0.05 10*3/mm3 Final   01/02/2024 0.06 0.00 - 0.10  10*3/uL Final     nRBC   Date Value Ref Range Status   01/09/2024 0.0 0.0 - 0.2 /100 WBC Final       Glucose   Date Value Ref Range Status   01/09/2024 79 65 - 99 mg/dL Final     Sodium   Date Value Ref Range Status   01/09/2024 134 (L) 136 - 145 mmol/L Final   12/30/2022 141 136 - 145 mmol/L Final     Potassium   Date Value Ref Range Status   01/09/2024 3.3 (L) 3.5 - 5.2 mmol/L Final   12/30/2022 4.1 3.5 - 5.1 mmol/L Final     CO2   Date Value Ref Range Status   01/09/2024 20.7 (L) 22.0 - 29.0 mmol/L Final     Total CO2   Date Value Ref Range Status   12/30/2022 21 (L) 22 - 29 mmol/L Final   06/24/2022 22 22 - 29 mmol/L Final     Chloride   Date Value Ref Range Status   01/09/2024 104 98 - 107 mmol/L Final   12/30/2022 111 (H) 98 - 107 mmol/L Final     Anion Gap   Date Value Ref Range Status   01/09/2024 9.3 5.0 - 15.0 mmol/L Final   12/30/2022 9 5 - 13 (arb'U) Final     Comment:     (note)  Calculation- Na - (Cl + CO2)     Creatinine   Date Value Ref Range Status   01/09/2024 0.87 0.57 - 1.00 mg/dL Final   12/30/2022 0.79 0.55 - 1.02 mg/dL Final   12/17/2021 73.3 15 - 500 mg/dL Final     BUN   Date Value Ref Range Status   01/09/2024 4 (L) 8 - 23 mg/dL Final   12/30/2022 10 10 - 20 mg/dL Final     BUN/Creatinine Ratio   Date Value Ref Range Status   01/09/2024 4.6 (L) 7.0 - 25.0 Final   12/30/2022 12.2 RATIO Final     Calcium   Date Value Ref Range Status   01/09/2024 9.5 8.6 - 10.5 mg/dL Final   12/30/2022 8.8 8.4 - 10.2 mg/dL Final     eGFR Non  Am   Date Value Ref Range Status   11/24/2015 >60 mL/min/1.732 Final     Alkaline Phosphatase   Date Value Ref Range Status   01/09/2024 85 39 - 117 U/L Final   12/30/2022 78 40 - 150 U/L Final     Total Protein   Date Value Ref Range Status   01/09/2024 6.8 6.0 - 8.5 g/dL Final   12/30/2022 6.0 (L) 6.2 - 8.0 g/dL Final     ALT (SGPT)   Date Value Ref Range Status   01/09/2024 15 1 - 33 U/L Final   12/30/2022 16 10 - 35 U/L Final     AST (SGOT)   Date Value Ref  Range Status   01/09/2024 24 1 - 32 U/L Final   12/30/2022 22 10 - 35 U/L Final     Total Bilirubin   Date Value Ref Range Status   01/09/2024 0.3 0.0 - 1.2 mg/dL Final   12/30/2022 0.3 0.2 - 1.2 mg/dL Final     Albumin   Date Value Ref Range Status   01/09/2024 3.9 3.5 - 5.2 g/dL Final   12/30/2022 4.2 3.2 - 4.6 g/dL Final     Globulin   Date Value Ref Range Status   01/09/2024 2.9 gm/dL Final   12/30/2022 1.8 1.5 - 4.5 g/dL Final     A/G Ratio   Date Value Ref Range Status   12/30/2022 2.3 1.1 - 2.5 RATIO Final         Imaging Results (Last 7 Days)       ** No results found for the last 168 hours. **                   Assessment & Plan     Diagnoses and all orders for this visit:    1. Irritable bowel syndrome with diarrhea (Primary)  Assessment & Plan:  - Start Viberzi   - Obtain CT Enterography to further evaluate persistent diarrhea     Orders:  -     Eluxadoline (Viberzi) 100 MG tablet; Take 1 tablet by mouth 2 (Two) Times a Day.  Dispense: 60 tablet; Refill: 5  -     CT Abdomen Pelvis With Contrast Enterography    2. Iron deficiency anemia, unspecified iron deficiency anemia type  Assessment & Plan:  - Etiology is likely due to gastric bypass   - Continue oral iron supplementation   - Has had recent EGD & colonoscopy. Obtain CT Enterography to further evaluate     Orders:  -     CT Abdomen Pelvis With Contrast Enterography    3. Campylobacter diarrhea  Assessment & Plan:  - Treated with Levaquin followed by a 3 day course of Azithromycin 500 mg QD. Diarrhea remains persistent despite completing antibiotics       RTC in 2 months     I have discussed the above plan with the patient.  They verbalize understanding and are in agreement with the plan.  They have been advised to contact the office for any questions, concerns, or changes related to their health.

## 2024-01-12 NOTE — ASSESSMENT & PLAN NOTE
- Etiology is likely due to gastric bypass   - Continue oral iron supplementation   - Has had recent EGD & colonoscopy. Obtain CT Enterography to further evaluate

## 2024-01-15 NOTE — TELEPHONE ENCOUNTER
Per [facility] denied Viberzi- you tried and failed alternative: Alosetron, loperamide.    Will send to provider

## 2024-01-17 ENCOUNTER — TELEPHONE (OUTPATIENT)
Dept: GASTROENTEROLOGY | Facility: CLINIC | Age: 64
End: 2024-01-17
Payer: COMMERCIAL

## 2024-01-17 DIAGNOSIS — K58.0 IRRITABLE BOWEL SYNDROME WITH DIARRHEA: Primary | ICD-10-CM

## 2024-01-17 RX ORDER — LOPERAMIDE HYDROCHLORIDE 2 MG/1
4 TABLET ORAL 4 TIMES DAILY PRN
Qty: 240 TABLET | Refills: 11 | Status: SHIPPED | OUTPATIENT
Start: 2024-01-17

## 2024-01-17 NOTE — TELEPHONE ENCOUNTER
PT CALLED TO SAY SHE GOT NURSE'S MESSAGE    SHE HAD BEEN GIVEN A DISCOUNT CARD FOR THE VIBERZI AND WAS ABLE TO GET IT FOR JUST 30$    SHE DOESN'T NEED THE NEW RX CALLED AS SHE IS TAKING THE VIBERZI NOW.    SHE WANTED TO LET THE DR KNOW

## 2024-01-19 ENCOUNTER — TELEPHONE (OUTPATIENT)
Dept: GASTROENTEROLOGY | Facility: CLINIC | Age: 64
End: 2024-01-19
Payer: COMMERCIAL

## 2024-01-19 NOTE — TELEPHONE ENCOUNTER
LOV: 1.12.24     Campylobacter jejuni infection   - Diagnosed via stool culture on 12/29 at [facility]. Started on 10 day course of Ciprofloxacin with minimal relief.   - Seen in [facility] ER on 1/9 for persistent diarrhea ongoing for 2 weeks. Discharged on a 3 day course of Azithromycin 500 mg QD.   - During encounter, reports persistent diarrhea despite finishing Azithromycin.   - Reports having 5 to 6 watery bowel movements per day.       PT states started Viberzi     Send to Provider for recommendations    CT not scheduled til 1-25-24

## 2024-01-19 NOTE — TELEPHONE ENCOUNTER
PT CALLED WANTING SOME CLARIFICATION ON HER MEDS AND HER SYMPTOMS    SHE SAW DR HANDY ON THE 12TH. SHE STARTED TAKING THE   Eluxadoline (Viberzi) 100 MG tablet  ON THE 13TH    SHE IS WORRIED THAT ITS NOT WORKING OR SOMETHING ELSE MAY BE WRONG    SHE STATED SHE HAS CONSTANT STOMACH PAINS THAT ARE EXASPERATED WHEN SHE EATS.     SHE STATED SHE IS HAVING CONTINUAL DIARRHEA MINIUM OF 3 TIMES A DAY AND MORE    SHE IS DRINKING BOOST AND GATORADE AND DRINKS BETWEEN 32-64 OZ OF WATER DAILY    SHE WANTS TO SEE IF THE MEDS ARE NOT WORKING, NOT HAD ENOUGH TIME TO START WORKING. WHAT ELSE SHE CAN BE DOING, ETC      PLEASE CALL BACK [phone]

## 2024-01-25 ENCOUNTER — TELEPHONE (OUTPATIENT)
Dept: GASTROENTEROLOGY | Facility: CLINIC | Age: 64
End: 2024-01-25
Payer: COMMERCIAL

## 2024-01-25 ENCOUNTER — HOSPITAL ENCOUNTER (OUTPATIENT)
Dept: CT IMAGING | Facility: HOSPITAL | Age: 64
Discharge: HOME | End: 2024-01-25
Admitting: STUDENT IN AN ORGANIZED HEALTH CARE EDUCATION/TRAINING PROGRAM
Payer: COMMERCIAL

## 2024-01-25 ENCOUNTER — HOSPITAL ENCOUNTER (EMERGENCY)
Facility: HOSPITAL | Age: 64
Discharge: HOME | End: 2024-01-25
Attending: STUDENT IN AN ORGANIZED HEALTH CARE EDUCATION/TRAINING PROGRAM
Payer: COMMERCIAL

## 2024-01-25 VITALS
RESPIRATION RATE: 16 BRPM | HEART RATE: 77 BPM | BODY MASS INDEX: 26.58 KG/M2 | TEMPERATURE: 98.2 F | DIASTOLIC BLOOD PRESSURE: 80 MMHG | WEIGHT: 150 LBS | OXYGEN SATURATION: 99 % | HEIGHT: 63 IN | SYSTOLIC BLOOD PRESSURE: 148 MMHG

## 2024-01-25 DIAGNOSIS — R93.3 ABNORMAL FINDINGS ON DIAGNOSTIC IMAGING OF DIGESTIVE SYSTEM: Primary | ICD-10-CM

## 2024-01-25 DIAGNOSIS — K52.9 CHRONIC DIARRHEA: ICD-10-CM

## 2024-01-25 DIAGNOSIS — K52.9 COLITIS: Primary | ICD-10-CM

## 2024-01-25 LAB
ALBUMIN SERPL-MCNC: 3.7 G/DL (ref 3.5–5.2)
ALBUMIN/GLOB SERPL: 1.4 G/DL
ALP SERPL-CCNC: 82 U/L (ref 39–117)
ALT SERPL W P-5'-P-CCNC: 15 U/L (ref 1–33)
ANION GAP SERPL CALCULATED.3IONS-SCNC: 9.9 MMOL/L (ref 5–15)
AST SERPL-CCNC: 24 U/L (ref 1–32)
BASOPHILS # BLD AUTO: 0.04 10*3/MM3 (ref 0–0.2)
BASOPHILS NFR BLD AUTO: 0.8 % (ref 0–1.5)
BILIRUB SERPL-MCNC: 0.3 MG/DL (ref 0–1.2)
BILIRUB UR QL STRIP: NEGATIVE
BUN SERPL-MCNC: 7 MG/DL (ref 8–23)
BUN/CREAT SERPL: 8.4 (ref 7–25)
CALCIUM SPEC-SCNC: 9.2 MG/DL (ref 8.6–10.5)
CHLORIDE SERPL-SCNC: 110 MMOL/L (ref 98–107)
CLARITY UR: CLEAR
CO2 SERPL-SCNC: 20.1 MMOL/L (ref 22–29)
COLOR UR: YELLOW
CREAT SERPL-MCNC: 0.83 MG/DL (ref 0.57–1)
DEPRECATED RDW RBC AUTO: 54.4 FL (ref 37–54)
EGFRCR SERPLBLD CKD-EPI 2021: 79.8 ML/MIN/1.73
EOSINOPHIL # BLD AUTO: 0.13 10*3/MM3 (ref 0–0.4)
EOSINOPHIL NFR BLD AUTO: 2.7 % (ref 0.3–6.2)
ERYTHROCYTE [DISTWIDTH] IN BLOOD BY AUTOMATED COUNT: 18.4 % (ref 12.3–15.4)
GLOBULIN UR ELPH-MCNC: 2.7 GM/DL
GLUCOSE SERPL-MCNC: 95 MG/DL (ref 65–99)
GLUCOSE UR STRIP-MCNC: NEGATIVE MG/DL
HCT VFR BLD AUTO: 38 % (ref 34–46.6)
HGB BLD-MCNC: 11.7 G/DL (ref 12–15.9)
HGB UR QL STRIP.AUTO: NEGATIVE
IMM GRANULOCYTES # BLD AUTO: 0.01 10*3/MM3 (ref 0–0.05)
IMM GRANULOCYTES NFR BLD AUTO: 0.2 % (ref 0–0.5)
KETONES UR QL STRIP: NEGATIVE
LEUKOCYTE ESTERASE UR QL STRIP.AUTO: NEGATIVE
LIPASE SERPL-CCNC: 72 U/L (ref 13–60)
LYMPHOCYTES # BLD AUTO: 1.79 10*3/MM3 (ref 0.7–3.1)
LYMPHOCYTES NFR BLD AUTO: 36.9 % (ref 19.6–45.3)
MCH RBC QN AUTO: 25.4 PG (ref 26.6–33)
MCHC RBC AUTO-ENTMCNC: 30.8 G/DL (ref 31.5–35.7)
MCV RBC AUTO: 82.6 FL (ref 79–97)
MONOCYTES # BLD AUTO: 0.45 10*3/MM3 (ref 0.1–0.9)
MONOCYTES NFR BLD AUTO: 9.3 % (ref 5–12)
NEUTROPHILS NFR BLD AUTO: 2.43 10*3/MM3 (ref 1.7–7)
NEUTROPHILS NFR BLD AUTO: 50.1 % (ref 42.7–76)
NITRITE UR QL STRIP: NEGATIVE
NRBC BLD AUTO-RTO: 0 /100 WBC (ref 0–0.2)
PH UR STRIP.AUTO: 5.5 [PH] (ref 4.5–8)
PLATELET # BLD AUTO: 229 10*3/MM3 (ref 140–450)
PMV BLD AUTO: 11.4 FL (ref 6–12)
POTASSIUM SERPL-SCNC: 3.9 MMOL/L (ref 3.5–5.2)
PROT SERPL-MCNC: 6.4 G/DL (ref 6–8.5)
PROT UR QL STRIP: NEGATIVE
RBC # BLD AUTO: 4.6 10*6/MM3 (ref 3.77–5.28)
SODIUM SERPL-SCNC: 140 MMOL/L (ref 136–145)
SP GR UR STRIP: 1.01 (ref 1–1.03)
UROBILINOGEN UR QL STRIP: NORMAL
WBC NRBC COR # BLD AUTO: 4.85 10*3/MM3 (ref 3.4–10.8)

## 2024-01-25 PROCEDURE — 80053 COMPREHEN METABOLIC PANEL: CPT | Performed by: STUDENT IN AN ORGANIZED HEALTH CARE EDUCATION/TRAINING PROGRAM

## 2024-01-25 PROCEDURE — 85025 COMPLETE CBC W/AUTO DIFF WBC: CPT | Performed by: STUDENT IN AN ORGANIZED HEALTH CARE EDUCATION/TRAINING PROGRAM

## 2024-01-25 PROCEDURE — 96365 THER/PROPH/DIAG IV INF INIT: CPT

## 2024-01-25 PROCEDURE — 81003 URINALYSIS AUTO W/O SCOPE: CPT | Performed by: STUDENT IN AN ORGANIZED HEALTH CARE EDUCATION/TRAINING PROGRAM

## 2024-01-25 PROCEDURE — 25510000001 IOPAMIDOL 61 % SOLUTION: Performed by: STUDENT IN AN ORGANIZED HEALTH CARE EDUCATION/TRAINING PROGRAM

## 2024-01-25 PROCEDURE — 25010000002 LEVOFLOXACIN PER 250 MG: Performed by: STUDENT IN AN ORGANIZED HEALTH CARE EDUCATION/TRAINING PROGRAM

## 2024-01-25 PROCEDURE — 99283 EMERGENCY DEPT VISIT LOW MDM: CPT

## 2024-01-25 PROCEDURE — 74177 CT ABD & PELVIS W/CONTRAST: CPT

## 2024-01-25 PROCEDURE — 83690 ASSAY OF LIPASE: CPT | Performed by: STUDENT IN AN ORGANIZED HEALTH CARE EDUCATION/TRAINING PROGRAM

## 2024-01-25 RX ORDER — LEVOFLOXACIN 500 MG/1
500 TABLET, FILM COATED ORAL DAILY
Qty: 14 TABLET | Refills: 0 | Status: SHIPPED | OUTPATIENT
Start: 2024-01-25 | End: 2024-02-08

## 2024-01-25 RX ORDER — LEVOFLOXACIN 5 MG/ML
750 INJECTION, SOLUTION INTRAVENOUS ONCE
Status: COMPLETED | OUTPATIENT
Start: 2024-01-25 | End: 2024-01-25

## 2024-01-25 RX ADMIN — LEVOFLOXACIN 750 MG: 5 INJECTION, SOLUTION INTRAVENOUS at 19:18

## 2024-01-25 RX ADMIN — IOPAMIDOL 100 ML: 612 INJECTION, SOLUTION INTRAVENOUS at 10:00

## 2024-01-25 NOTE — Clinical Note
RUTH CREWS  [facility] EMERGENCY DEPARTMENT  [address] [place] [place] [address]  Phone: [phone]    Emma Villavicencio was seen and treated in our emergency department on 1/25/2024.  She may return to work on 01/29/2024.         Thank you for choosing [facility].    Mukund Vazquez MD

## 2024-01-25 NOTE — TELEPHONE ENCOUNTER
PT CALLED LAST WEEK ABOUT HER PAINS AND DIARRHEA    SHE WAS ADVISED TO STAY ON THE VIBERZI. SHE HAS    SHE IS WAY WORSE...SHES MISERABLE AND WAS CRYING A LOT TRYING TO TELL ME WHAT WAS GOING ON    SHE SAID SHE'S PLEADING FOR HELP. SHE CANNOT EAT OR DRINK AS IT GOES STRAIGHT THRU HER. SHE'S NOT AT WORK DUE TO THE AMOUNT AND URGENCY SHE HAS TO GO.    SHE HAD A CT DONE YESTERDAY OF HER ABDOMIN. SHE IS HAVING ACCIDENTS IN HER CAR, THAT'S HOW QUICK AND SEVERE HER DIARRHEA BOUGHT'S ARE. ITS CONSTANT. SHE SAID SHE CAN'T KEEP LIVING AND FEELING THIS WAY WITHOUT EVEN BEING ABLE TO LEAVE THE HOUSE.      SHE WANTS TO TALK TO DR HANDY OR THE NURSE SOON TODAY.....    TOLD HER I WOULD SEND A MESSAGE AND SOMEONE WOULD GET BACK WITH HER..    [phone]

## 2024-01-25 NOTE — ED PROVIDER NOTES
Subjective   History of Present Illness  Pt is a 63 y.o. female with PMH as listed who presents for   Chief Complaint   Patient presents with    Diarrhea     Started in December, denies abd pain, denies n/v, had CT yesterday, GI told her to come here       Patient is a 61-year-old female presents for diarrhea persistent since end of December.  Had CT today which was positive for colitis, otherwise no acute findings.  Denies any abdominal pain but has had persistent diarrhea.  Has not had any nausea or vomiting or fever.  Has no other new complaints.  Was referred to by GI to come here for evaluation given her severity of symptoms.    Review of Systems    Past Medical History:   Diagnosis Date    Depression     Diabetes mellitus, type II     History of PCOS     History of shingles 2005    Hyperlipidemia     Hypertension     Migraine     Pap smear of cervix with ASCUS, cannot exclude HGSIL 11/05/2007 11/5/07; 1/8/09; 7/9/09       Allergies   Allergen Reactions    Bupropion Other (See Comments) and Diarrhea       Past Surgical History:   Procedure Laterality Date    BARIATRIC SURGERY  2000    Gastric bypass    COLONOSCOPY  2023    D & C HYSTEROSCOPY ENDOMETRIAL ABLATION  06/01/2010    W/KATHIE. DR. DAVID HERNDON/KE[facility].    ENDOMETRIAL BIOPSY  12/03/2007    BENIGN    GASTROTOMY  05/2004    HYSTERECTOMY      TONSILLECTOMY  1967    TUBAL ABDOMINAL LIGATION  1986    UPPER GASTROINTESTINAL ENDOSCOPY  2023       Family History   Problem Relation Age of Onset    Breast cancer Mother 72    Stroke Mother     Hypertension Mother     Heart disease Mother     No Known Problems Father        Social History     Socioeconomic History    Marital status:      Spouse name: GOSIA    Number of children: 2   Tobacco Use    Smoking status: Never    Smokeless tobacco: Never   Vaping Use    Vaping Use: Never used   Substance and Sexual Activity    Alcohol use: No    Drug use: No    Sexual activity: Yes     Partners: Male     Birth  control/protection: Surgical     Comment: BTL 1986           Objective   Physical Exam  Constitutional:       Appearance: Normal appearance.   HENT:      Head: Normocephalic and atraumatic.      Mouth/Throat:      Mouth: Mucous membranes are moist.      Pharynx: Oropharynx is clear.   Eyes:      Conjunctiva/sclera: Conjunctivae normal.   Cardiovascular:      Rate and Rhythm: Normal rate.   Pulmonary:      Effort: Pulmonary effort is normal.   Abdominal:      General: Abdomen is flat.      Palpations: Abdomen is soft.      Tenderness: There is no abdominal tenderness.   Musculoskeletal:      Cervical back: Neck supple.   Skin:     General: Skin is warm and dry.   Neurological:      Mental Status: She is alert.   Psychiatric:         Mood and Affect: Mood normal.         Procedures           ED Course  ED Course as of 01/25/24 1946 Thu Jan 25, 2024 1741 Patient is 63-year-old female with past medical history of known Campylobacter colitis who presents for persistent diarrhea.  Exam is unremarkable.  Will obtain CBC, CMP, lipase, and treat with levofloxacin. [JF]   1944 Labwork unremarkable, hemoglobin stable, creatinine normal.  Lipase minimally elevated at 72 likely due to colitis.  Discussed with patient plan for discharge with PCP follow-up as well as follow-up with her GI Dr. Yoon and prescription for Levaquin provided for Campylobacter  which she had positive cultures for previously in the setting of this episode of colitis.  Patient understands and agrees with plan of care. [JF]      ED Course User Index  [JF] Mukund Vazquez MD                                             Medical Decision Making  My differential diagnosis for diarrhea includes but is not limited to viral gastroenteritis, bacterial gastroenteritis, food poisoning, C. Difficile, bacterial infections, viral infections, fungal infections, parasitic infections, COVID-19, toxins and laxative abuse      Problems Addressed:  Colitis:  complicated acute illness or injury    Amount and/or Complexity of Data Reviewed  Labs: ordered.     Details: Lab work unremarkable for any acute findings, hemoglobin stable.    Risk  Prescription drug management.        Final diagnoses:   Colitis       ED Disposition  ED Disposition       ED Disposition   Discharge    Condition   Stable    Comment   --               Brittany Obrien MD  [address] [facility] [id number] [phone]    Schedule an appointment as soon as possible for a visit in 3 days  For re-evaluation    Manolo Hurley MD  [address] [facility] [address]  [place] [id number] [phone]    Schedule an appointment as soon as possible for a visit   For re-evaluation         Medication List        New Prescriptions      levoFLOXacin 500 MG tablet  Commonly known as: LEVAQUIN  Take 1 tablet by mouth Daily for 14 days.               Where to Get Your Medications        These medications were sent to [facility] [id number] - [place], [place] - [address] - [phone]  - [phone]  [address], [facility] [address]      Phone: [phone]   levoFLOXacin 500 MG tablet            Mukund Vazquez MD  01/25/24 1946

## 2024-01-26 ENCOUNTER — TELEPHONE (OUTPATIENT)
Dept: GASTROENTEROLOGY | Facility: CLINIC | Age: 64
End: 2024-01-26
Payer: COMMERCIAL

## 2024-01-26 PROBLEM — K52.9 CHRONIC DIARRHEA: Status: ACTIVE | Noted: 2024-01-25

## 2024-01-26 PROBLEM — R93.3 ABNORMAL FINDINGS ON DIAGNOSTIC IMAGING OF DIGESTIVE SYSTEM: Status: ACTIVE | Noted: 2024-01-25

## 2024-01-26 NOTE — TELEPHONE ENCOUNTER
Per HD: CT Results   CT Enterography obtained for chronic diarrhea which showed diffuse colonic wall thickening involving the right colon, transverse colon, and descending colon concerning for colitis.   - POC: Will schedule colonoscopy ASAP given concern for underlying IBD. Discontinue Viberzi since it worsens her symptoms.

## 2024-01-26 NOTE — TELEPHONE ENCOUNTER
PT WANTED TO LET DR HANDY KNOW THAT UPON HER DISCHARGE FROM HOSPITAL , THEY PRESCRIBED HER     levoFLOXacin (LEVAQUIN) 500 MG tablet [19806] (Order [id number])     THEY ALSO GAVE IT TO HER IN AN I.V.    SHE STATED THEY FOUND BACTERIA IN HER COLON.    SHE WANTS TO KNOW IF SHE STILL NEEDS TO CONTINUE TAKING HER VIBERZI?    SHE WANTS TO KNOW IF HE NEEDS TO SEE HER SOONER THAN HER PREVIOUSLY SCHEDULED APPT ON 03/12/2024?    PLEASE CALL HER [phone]

## 2024-01-31 ENCOUNTER — ANESTHESIA EVENT (OUTPATIENT)
Dept: PERIOP | Facility: HOSPITAL | Age: 64
End: 2024-01-31
Payer: COMMERCIAL

## 2024-02-01 ENCOUNTER — HOSPITAL ENCOUNTER (OUTPATIENT)
Facility: HOSPITAL | Age: 64
Setting detail: HOSPITAL OUTPATIENT SURGERY
Discharge: HOME | End: 2024-02-01
Attending: STUDENT IN AN ORGANIZED HEALTH CARE EDUCATION/TRAINING PROGRAM | Admitting: STUDENT IN AN ORGANIZED HEALTH CARE EDUCATION/TRAINING PROGRAM
Payer: COMMERCIAL

## 2024-02-01 ENCOUNTER — ANESTHESIA (OUTPATIENT)
Dept: PERIOP | Facility: HOSPITAL | Age: 64
End: 2024-02-01
Payer: COMMERCIAL

## 2024-02-01 VITALS
TEMPERATURE: 98 F | RESPIRATION RATE: 12 BRPM | DIASTOLIC BLOOD PRESSURE: 74 MMHG | WEIGHT: 147 LBS | SYSTOLIC BLOOD PRESSURE: 162 MMHG | BODY MASS INDEX: 26.05 KG/M2 | OXYGEN SATURATION: 96 % | HEART RATE: 60 BPM | HEIGHT: 63 IN

## 2024-02-01 DIAGNOSIS — K52.9 CHRONIC DIARRHEA: ICD-10-CM

## 2024-02-01 DIAGNOSIS — R93.3 ABNORMAL FINDINGS ON DIAGNOSTIC IMAGING OF DIGESTIVE SYSTEM: ICD-10-CM

## 2024-02-01 LAB — GLUCOSE BLDC GLUCOMTR-MCNC: 101 MG/DL (ref 70–130)

## 2024-02-01 PROCEDURE — 25010000002 PROPOFOL 200 MG/20ML EMULSION: Performed by: ANESTHESIOLOGY

## 2024-02-01 PROCEDURE — 45380 COLONOSCOPY AND BIOPSY: CPT | Performed by: STUDENT IN AN ORGANIZED HEALTH CARE EDUCATION/TRAINING PROGRAM

## 2024-02-01 PROCEDURE — 82948 REAGENT STRIP/BLOOD GLUCOSE: CPT

## 2024-02-01 PROCEDURE — 88305 TISSUE EXAM BY PATHOLOGIST: CPT | Performed by: STUDENT IN AN ORGANIZED HEALTH CARE EDUCATION/TRAINING PROGRAM

## 2024-02-01 PROCEDURE — 25810000003 LACTATED RINGERS PER 1000 ML: Performed by: NURSE ANESTHETIST, CERTIFIED REGISTERED

## 2024-02-01 RX ORDER — PROPOFOL 10 MG/ML
INJECTION, EMULSION INTRAVENOUS AS NEEDED
Status: DISCONTINUED | OUTPATIENT
Start: 2024-02-01 | End: 2024-02-01 | Stop reason: SURG

## 2024-02-01 RX ORDER — SODIUM CHLORIDE 9 MG/ML
40 INJECTION, SOLUTION INTRAVENOUS AS NEEDED
Status: DISCONTINUED | OUTPATIENT
Start: 2024-02-01 | End: 2024-02-01 | Stop reason: HOSPADM

## 2024-02-01 RX ORDER — LIDOCAINE HYDROCHLORIDE 20 MG/ML
INJECTION, SOLUTION INFILTRATION; PERINEURAL AS NEEDED
Status: DISCONTINUED | OUTPATIENT
Start: 2024-02-01 | End: 2024-02-01 | Stop reason: SURG

## 2024-02-01 RX ORDER — SODIUM CHLORIDE 0.9 % (FLUSH) 0.9 %
10 SYRINGE (ML) INJECTION AS NEEDED
Status: DISCONTINUED | OUTPATIENT
Start: 2024-02-01 | End: 2024-02-01 | Stop reason: HOSPADM

## 2024-02-01 RX ORDER — SODIUM CHLORIDE, SODIUM LACTATE, POTASSIUM CHLORIDE, CALCIUM CHLORIDE 600; 310; 30; 20 MG/100ML; MG/100ML; MG/100ML; MG/100ML
9 INJECTION, SOLUTION INTRAVENOUS CONTINUOUS PRN
Status: DISCONTINUED | OUTPATIENT
Start: 2024-02-01 | End: 2024-02-01 | Stop reason: HOSPADM

## 2024-02-01 RX ORDER — SODIUM CHLORIDE 0.9 % (FLUSH) 0.9 %
10 SYRINGE (ML) INJECTION EVERY 12 HOURS SCHEDULED
Status: DISCONTINUED | OUTPATIENT
Start: 2024-02-01 | End: 2024-02-01 | Stop reason: HOSPADM

## 2024-02-01 RX ORDER — MAGNESIUM HYDROXIDE 1200 MG/15ML
LIQUID ORAL AS NEEDED
Status: DISCONTINUED | OUTPATIENT
Start: 2024-02-01 | End: 2024-02-01 | Stop reason: HOSPADM

## 2024-02-01 RX ADMIN — LIDOCAINE HYDROCHLORIDE 80 MG: 20 INJECTION, SOLUTION INFILTRATION; PERINEURAL at 12:36

## 2024-02-01 RX ADMIN — SODIUM CHLORIDE, POTASSIUM CHLORIDE, SODIUM LACTATE AND CALCIUM CHLORIDE 9 ML/HR: 600; 310; 30; 20 INJECTION, SOLUTION INTRAVENOUS at 10:38

## 2024-02-01 RX ADMIN — PROPOFOL 330 MG: 10 INJECTION, EMULSION INTRAVENOUS at 12:36

## 2024-02-01 NOTE — ANESTHESIA PREPROCEDURE EVALUATION
Anesthesia Evaluation     Patient summary reviewed and Nursing notes reviewed   NPO Solid Status: > 8 hours  NPO Liquid Status: > 8 hours           Airway   Mallampati: II  TM distance: >3 FB  Neck ROM: full  No difficulty expected  Dental          Pulmonary - normal exam   (-) recent URI  Cardiovascular - normal exam  Exercise tolerance: good (4-7 METS)    (+) hyperlipidemia  (-) hypertension      Neuro/Psych  (+) seizures (when tx with Bupropion), psychiatric history Depression  (-) headaches  GI/Hepatic/Renal/Endo    (+) diabetes mellitus type 2 well controlled, thyroid problem hypothyroidism    Musculoskeletal (-) negative ROS    Abdominal    Substance History - negative use     OB/GYN negative ob/gyn ROS         Other - negative ROS                     Anesthesia Plan    ASA 2     MAC   total IV anesthesia  intravenous induction     Anesthetic plan, risks, benefits, and alternatives have been provided, discussed and informed consent has been obtained with: patient.    Use of blood products discussed with patient  Consented to blood products.      CODE STATUS:

## 2024-02-01 NOTE — ANESTHESIA POSTPROCEDURE EVALUATION
Patient: Emma Villavicencio    Procedure Summary       Date: 02/01/24 Room / Location: [facility] ENDOSCOPY 1 /  [facility] OR    Anesthesia Start: 1230 Anesthesia Stop: 1304    Procedure: COLONOSCOPY WITH BIOPSY Diagnosis:       Abnormal findings on diagnostic imaging of digestive system      Chronic diarrhea      (Abnormal findings on diagnostic imaging of digestive system [R93.3])      (Chronic diarrhea [K52.9])    Surgeons: Manolo Hurley MD Provider: Taylor Benítez MD    Anesthesia Type: MAC ASA Status: 2            Anesthesia Type: MAC    Vitals  Vitals Value Taken Time   /72 02/01/24 1330   Temp     Pulse 66 02/01/24 1334   Resp 12 02/01/24 1315   SpO2 99 % 02/01/24 1334   Vitals shown include unfiled device data.        Post Anesthesia Care and Evaluation    Patient location during evaluation: PHASE II  Patient participation: complete - patient participated  Level of consciousness: awake and alert  Pain score: 0  Pain management: adequate    Airway patency: patent  Anesthetic complications: No anesthetic complications  PONV Status: none  Cardiovascular status: acceptable  Respiratory status: acceptable  Hydration status: acceptable

## 2024-02-01 NOTE — H&P
Patient Care Team:  Brittany Obrien MD as PCP - General    CHIEF COMPLAINT:  colitis per CT   imaging    HISTORY OF PRESENT ILLNESS:  For persistent diarrhea and colitis per CT imaging (involving R colon, transverse, & descending colon)    Past Medical History:   Diagnosis Date    Depression     Diabetes mellitus, type II     History of PCOS     History of shingles 2005    Hyperlipidemia     Hypertension     Migraine     Pap smear of cervix with ASCUS, cannot exclude HGSIL 11/05/2007 11/5/07; 1/8/09; 7/9/09     Past Surgical History:   Procedure Laterality Date    ABLATION COLPOCLESIS N/A     does not recall date    BARIATRIC SURGERY  2000    Gastric bypass    COLONOSCOPY  2023    D & C HYSTEROSCOPY ENDOMETRIAL ABLATION  06/01/2010    W/KATHIE. DR. DAVID HERNDON/[facility].    ENDOMETRIAL BIOPSY  12/03/2007    BENIGN    GASTROTOMY  05/2004    HYSTERECTOMY      pt denies having hysterectomy   HAD ABLATION    TONSILLECTOMY  1967    TUBAL ABDOMINAL LIGATION  1986    UPPER GASTROINTESTINAL ENDOSCOPY  2023     Family History   Problem Relation Age of Onset    Breast cancer Mother 72    Stroke Mother     Hypertension Mother     Heart disease Mother     No Known Problems Father      Social History     Tobacco Use    Smoking status: Never    Smokeless tobacco: Never   Vaping Use    Vaping Use: Never used   Substance Use Topics    Alcohol use: No    Drug use: Never     Facility-Administered Medications Prior to Admission   Medication Dose Route Frequency Provider Last Rate Last Admin    [DISCONTINUED] cyanocobalamin injection 1,000 mcg  1,000 mcg Intramuscular Q28 Days Brittany Obrien MD   1,000 mcg at 03/17/16 1125     Medications Prior to Admission   Medication Sig Dispense Refill Last Dose    desvenlafaxine (PRISTIQ) 100 MG 24 hr tablet    1/30/2024    desvenlafaxine (PRISTIQ) 50 MG 24 hr tablet    1/30/2024    Estradiol-Norethindrone Acet 0.5-0.1 MG per tablet Take 1 tablet by mouth Daily.   1/30/2024    levoFLOXacin (LEVAQUIN)  "500 MG tablet Take 1 tablet by mouth Daily for 14 days. 14 tablet 0 1/30/2024    levothyroxine (SYNTHROID, LEVOTHROID) 150 MCG tablet Take 1 tablet by mouth Daily.   1/30/2024    OLANZapine (zyPREXA) 2.5 MG tablet    1/30/2024    pravastatin (PRAVACHOL) 10 MG tablet Take 1 tablet by mouth Every Night.   1/30/2024    topiramate (TOPAMAX) 100 MG tablet Take 1 tablet by mouth Daily.   1/30/2024    traZODone (DESYREL) 100 MG tablet    1/30/2024    ferrous sulfate 325 (65 FE) MG tablet Take  by mouth daily.   1/24/2024    metFORMIN (GLUCOPHAGE) 1000 MG tablet Take 1 tablet by mouth.   1/30/2024    pioglitazone (ACTOS) 30 MG tablet Take 1 tablet by mouth Daily.   1/30/2024    Trulicity 3 MG/0.5ML solution pen-injector Inject 0.5 mL under the skin into the appropriate area as directed.   1/18/2024     Allergies:  Bupropion    REVIEW OF SYSTEMS:  Please see the above history of present illness for pertinent positives and negatives.  The remainder of the patient's systems have been reviewed and are negative.     Vital Signs  Temp:  [98 °F (36.7 °C)] 98 °F (36.7 °C)  Heart Rate:  [68] 68  Resp:  [14] 14  BP: (141)/(77) 141/77    Flowsheet Rows      Flowsheet Row First Filed Value   Admission Height 160 cm (63\") Documented at 02/01/2024 1020   Admission Weight 66.7 kg (147 lb) Documented at 02/01/2024 1020             Physical Exam:  Physical Exam   Constitutional: Patient appears well-developed and well-nourished and in no acute distress   HEENT:   Head: Normocephalic and atraumatic.   Eyes:  Pupils are equal, round, and reactive to light. EOM are intact. Sclerae are anicteric and non-injected.  Mouth and Throat: Patient has moist mucous membranes. Oropharynx is clear of any erythema or exudate.     Neck: Neck supple. No JVD present. No thyromegaly present. No lymphadenopathy present.  Cardiovascular: Regular rate, regular rhythm, S1 normal and S2 normal.  Exam reveals no gallop and no friction rub.  No murmur " heard.  Pulmonary/Chest: Lungs are clear to auscultation bilaterally. No respiratory distress. No wheezes. No rhonchi. No rales.   Abdominal: Soft. Bowel sounds are normal. No distension and no mass. There is no hepatosplenomegaly. There is no tenderness.   Musculoskeletal: Normal Muscle tone  Extremities: No edema. Pulses are palpable in all 4 extremities.  Neurological: Patient is alert and oriented to person, place, and time. Cranial nerves II-XII are grossly intact with no focal deficits.  Skin: Skin is warm. No rash noted. Nails show no clubbing.  No cyanosis or erythema.    Debilities/Disabilities Identified: None  Emotional Behavior: Appropriate     Results Review:   I reviewed the patient's new clinical results.    Lab Results (most recent)       Procedure Component Value Units Date/Time    POC Glucose Once [[id number]]  (Normal) Collected: 02/01/24 1040    Specimen: Blood Updated: 02/01/24 1048     Glucose 101 mg/dL             Imaging Results (Most Recent)       None          reviewed    ECG/EMG Results (most recent)       None          reviewed    Assessment & Plan   Colitis per ct imaging /  colonoscopy      I discussed the patient's findings and my recommendations with patient.     Manolo Hurley MD  02/01/24  12:46 EST    Time: 10 min prior to procedure.

## 2024-02-02 LAB
LAB AP CASE REPORT: NORMAL
PATH REPORT.FINAL DX SPEC: NORMAL
PATH REPORT.GROSS SPEC: NORMAL

## 2024-02-06 NOTE — PROGRESS NOTES
- For persistent diarrhea and colitis per CT imaging.   - Findings/path: random colon biopsies obtained were negative for microscopic colitis   - POC:  No inflammation seen suggestive of IBD

## 2024-04-02 ENCOUNTER — OFFICE VISIT (OUTPATIENT)
Dept: GASTROENTEROLOGY | Facility: CLINIC | Age: 64
End: 2024-04-02
Payer: COMMERCIAL

## 2024-04-02 VITALS
HEIGHT: 63 IN | BODY MASS INDEX: 27.57 KG/M2 | WEIGHT: 155.6 LBS | SYSTOLIC BLOOD PRESSURE: 128 MMHG | DIASTOLIC BLOOD PRESSURE: 80 MMHG

## 2024-04-02 DIAGNOSIS — K58.0 IRRITABLE BOWEL SYNDROME WITH DIARRHEA: Primary | ICD-10-CM

## 2024-04-02 DIAGNOSIS — R14.0 ABDOMINAL BLOATING: ICD-10-CM

## 2024-04-02 DIAGNOSIS — D50.9 IRON DEFICIENCY ANEMIA, UNSPECIFIED IRON DEFICIENCY ANEMIA TYPE: ICD-10-CM

## 2024-04-02 RX ORDER — BREXPIPRAZOLE 2 MG/1
2 TABLET ORAL DAILY
COMMUNITY
Start: 2024-01-12

## 2024-04-02 RX ORDER — PIOGLITAZONEHYDROCHLORIDE 30 MG/1
30 TABLET ORAL DAILY
COMMUNITY

## 2024-04-02 NOTE — PROGRESS NOTES
Emma Villavicencio is a 63 y.o. female with PMH of  BERENICE, T2DM, PCOS + noted below who presents with   Chief Complaint   Patient presents with    Irritable Bowel Syndrome    Diarrhea    Iron deficient anemia       Subjective     # Chronic Diarrhea, resolving   # Campylobacter jejuni infection, resolved  # IBS-D   - Reports diarrhea has significantly improved. Has one loose stool per week.   - Her main concern now is excessive gas that causes her distress to go out in public. Is persistent despite OTC Mylicon.   - C/s on 2/1 was normal including random colon biopsies negative for microscopic colitis.      # BERENICE  - Noted patient's Hgb has remained stable at 10 over the course of last year.   - Had iron studies in 10/2023 notable for T Saturation of 7%.   - Adherent to PO iron supplementation. Denies overt bleeding.   - EGD in 8/2023 with Dr. Marlon Maya showed evidence of gastrojejunal bypass. C/s on 2/1 was normal mucosa throughout.             Past Medical History:   Diagnosis Date    Anemia 2023    Colon polyp 2023    Depression     Diabetes mellitus, type II     History of PCOS     History of shingles 2005    Hyperlipidemia     Hypertension     Migraine     Pap smear of cervix with ASCUS, cannot exclude HGSIL 11/05/2007 11/5/07; 1/8/09; 7/9/09       Social History     Socioeconomic History    Marital status:      Spouse name: GOSIA    Number of children: 2   Tobacco Use    Smoking status: Never    Smokeless tobacco: Never   Vaping Use    Vaping status: Never Used   Substance and Sexual Activity    Alcohol use: No    Drug use: Never    Sexual activity: Yes     Partners: Male     Birth control/protection: Surgical     Comment: BTL 1986         Current Outpatient Medications:     desvenlafaxine (PRISTIQ) 100 MG 24 hr tablet, , Disp: , Rfl:     desvenlafaxine (PRISTIQ) 50 MG 24 hr tablet, , Disp: , Rfl:     Estradiol-Norethindrone Acet 0.5-0.1 MG per tablet, Take 1 tablet by mouth Daily., Disp: , Rfl:      ferrous sulfate 325 (65 FE) MG tablet, Take  by mouth daily., Disp: , Rfl:     levothyroxine (SYNTHROID, LEVOTHROID) 150 MCG tablet, Take 1 tablet by mouth Daily., Disp: , Rfl:     metFORMIN (GLUCOPHAGE) 1000 MG tablet, Take 1 tablet by mouth 2 (Two) Times a Day With Meals., Disp: , Rfl:     pioglitazone (ACTOS) 30 MG tablet, Take 1 tablet by mouth Daily., Disp: , Rfl:     pravastatin (PRAVACHOL) 10 MG tablet, Take 1 tablet by mouth Every Night., Disp: , Rfl:     Rexulti 2 MG tablet, Take 1 tablet by mouth Daily., Disp: , Rfl:     topiramate (TOPAMAX) 100 MG tablet, Take 1 tablet by mouth Daily., Disp: , Rfl:     traZODone (DESYREL) 100 MG tablet, , Disp: , Rfl:     Trulicity 3 MG/0.5ML solution pen-injector, Inject 0.5 mL under the skin into the appropriate area as directed 1 (One) Time Per Week., Disp: , Rfl:     metFORMIN (GLUCOPHAGE) 1000 MG tablet, Take 1 tablet by mouth 2 (Two) Times a Day With Meals., Disp: , Rfl:     pioglitazone (ACTOS) 30 MG tablet, Take 1 tablet by mouth Daily., Disp: , Rfl:     riFAXIMin (XIFAXAN) 550 MG tablet, Take 1 tablet by mouth Every 8 (Eight) Hours for 14 days., Disp: 42 tablet, Rfl: 1    Objective   Vitals:    04/02/24 1514   BP: 128/80         04/02/24  1514   Weight: 70.6 kg (155 lb 9.6 oz)     Body mass index is 27.56 kg/m².      Physical Exam  Vitals reviewed.   Constitutional:       Appearance: Normal appearance.   HENT:      Head: Normocephalic and atraumatic.   Eyes:      Extraocular Movements: Extraocular movements intact.      Conjunctiva/sclera: Conjunctivae normal.   Cardiovascular:      Rate and Rhythm: Normal rate and regular rhythm.      Heart sounds: Normal heart sounds.   Pulmonary:      Effort: Pulmonary effort is normal.      Breath sounds: Normal breath sounds.   Abdominal:      General: Abdomen is flat. Bowel sounds are normal. There is no distension.      Palpations: Abdomen is soft.      Tenderness: There is no abdominal tenderness.   Neurological:       Mental Status: She is alert.   Psychiatric:         Mood and Affect: Mood normal.         Behavior: Behavior normal.         WBC   Date Value Ref Range Status   01/31/2024 3.59 (L) 4.5 - 11.0 10*3/uL Final     RBC   Date Value Ref Range Status   01/31/2024 4.19 4.0 - 5.2 10*6/uL Final     Hemoglobin   Date Value Ref Range Status   01/31/2024 10.5 (L) 12.0 - 16.0 g/dL Final     Hematocrit   Date Value Ref Range Status   01/31/2024 34.6 (L) 36.0 - 46.0 % Final     MCV   Date Value Ref Range Status   01/31/2024 82.6 80.0 - 100.0 fL Final     MCH   Date Value Ref Range Status   01/31/2024 25.1 (L) 26.0 - 34.0 pg Final     MCHC   Date Value Ref Range Status   01/31/2024 30.3 (L) 31.0 - 37.0 g/dL Final     RDW   Date Value Ref Range Status   01/31/2024 19.5 (H) 12.0 - 16.8 % Final     RDW-SD   Date Value Ref Range Status   01/25/2024 54.4 (H) 37.0 - 54.0 fl Final     MPV   Date Value Ref Range Status   01/31/2024 11.8 8.4 - 12.4 fL Final     Platelets   Date Value Ref Range Status   01/31/2024 185 140 - 440 10*3/uL Final     Neutrophil Rel %   Date Value Ref Range Status   01/31/2024 41.5 (L) 45 - 80 % Final     Lymphocyte Rel %   Date Value Ref Range Status   01/31/2024 42.6 15 - 50 % Final     Monocyte Rel %   Date Value Ref Range Status   01/31/2024 9.7 0 - 15 % Final     Eosinophil %   Date Value Ref Range Status   01/31/2024 4.5 0 - 7 % Final     Basophil Rel %   Date Value Ref Range Status   01/31/2024 1.4 0 - 2 % Final     Immature Grans %   Date Value Ref Range Status   01/31/2024 0.3 0.0 - 1.0 % Final     Neutrophils Absolute   Date Value Ref Range Status   01/31/2024 1.49 (L) 2.0 - 8.8 10*3/uL Final     Lymphocytes Absolute   Date Value Ref Range Status   01/31/2024 1.53 0.7 - 5.5 10*3/uL Final     Monocytes Absolute   Date Value Ref Range Status   01/31/2024 0.35 0.0 - 1.7 10*3/uL Final     Eosinophils Absolute   Date Value Ref Range Status   01/31/2024 0.16 0.0 - 0.8 10*3/uL Final     Basophils Absolute   Date  Value Ref Range Status   01/31/2024 0.05 0.0 - 0.2 10*3/uL Final     Immature Grans, Absolute   Date Value Ref Range Status   01/31/2024 0.01 0.00 - 0.10 10*3/uL Final     nRBC   Date Value Ref Range Status   01/25/2024 0.0 0.0 - 0.2 /100 WBC Final       Lab Results   Component Value Date    GLUCOSE 95 01/25/2024    BUN 7 (L) 01/25/2024    CREATININE 0.83 01/25/2024    EGFRIFNONA >60 11/24/2015    EGFRIFAFRI >60 12/30/2022    BCR 8.4 01/25/2024    CO2 20.1 (L) 01/25/2024    CALCIUM 9.2 01/25/2024    PROTENTOTREF 6.8 11/24/2015    ALBUMIN 3.7 01/25/2024    LABIL2 2.3 12/30/2022    AST 24 01/25/2024    ALT 15 01/25/2024         Imaging Results (Last 7 Days)       ** No results found for the last 168 hours. **              Assessment & Plan   Diagnoses and all orders for this visit:    1. Irritable bowel syndrome with diarrhea (Primary)  Assessment & Plan:  - Start 2 week course of Rifaximin 550 mg TID     Orders:  -     riFAXIMin (XIFAXAN) 550 MG tablet; Take 1 tablet by mouth Every 8 (Eight) Hours for 14 days.  Dispense: 42 tablet; Refill: 1    2. Abdominal bloating  Assessment & Plan:  - Start Rifaximin for IBS-D as stated above   - Continue OTC Mylicon as needed       3. Iron deficiency anemia, unspecified iron deficiency anemia type  Assessment & Plan:  - Etiology is likely due to gastric bypass   - Continue oral iron supplementation         RTC in 3 months     I have discussed the above plan with the patient.  They verbalize understanding and are in agreement with the plan.  They have been advised to contact the office for any questions, concerns, or changes related to their health.

## 2024-04-03 ENCOUNTER — TELEPHONE (OUTPATIENT)
Dept: GASTROENTEROLOGY | Facility: CLINIC | Age: 64
End: 2024-04-03
Payer: COMMERCIAL

## 2024-04-03 NOTE — TELEPHONE ENCOUNTER
PA for Xifaxan has been sent through [facility].   PA has been approved.    Your PA request has been approved. Additional information will be provided in the approval communication. (Message 1142). Authorization Expiration Date: April 17, 2024.

## 2024-06-28 ENCOUNTER — HOSPITAL ENCOUNTER (EMERGENCY)
Facility: HOSPITAL | Age: 64
Discharge: OTHER INSTITUTION | End: 2024-06-28
Attending: EMERGENCY MEDICINE
Payer: COMMERCIAL

## 2024-06-28 VITALS
WEIGHT: 150 LBS | HEART RATE: 79 BPM | SYSTOLIC BLOOD PRESSURE: 149 MMHG | OXYGEN SATURATION: 99 % | RESPIRATION RATE: 16 BRPM | DIASTOLIC BLOOD PRESSURE: 78 MMHG | BODY MASS INDEX: 26.58 KG/M2 | TEMPERATURE: 98 F | HEIGHT: 63 IN

## 2024-06-28 DIAGNOSIS — F33.2 SEVERE EPISODE OF RECURRENT MAJOR DEPRESSIVE DISORDER, WITHOUT PSYCHOTIC FEATURES: ICD-10-CM

## 2024-06-28 DIAGNOSIS — R45.851 SUICIDAL IDEATION: Primary | ICD-10-CM

## 2024-06-28 LAB
ALBUMIN SERPL-MCNC: 4.6 G/DL (ref 3.5–5.2)
ALBUMIN/GLOB SERPL: 1.6 G/DL
ALP SERPL-CCNC: 98 U/L (ref 39–117)
ALT SERPL W P-5'-P-CCNC: 16 U/L (ref 1–33)
AMPHET+METHAMPHET UR QL: NEGATIVE
AMPHETAMINES UR QL: NEGATIVE
ANION GAP SERPL CALCULATED.3IONS-SCNC: 13.7 MMOL/L (ref 5–15)
AST SERPL-CCNC: 31 U/L (ref 1–32)
BARBITURATES UR QL SCN: NEGATIVE
BASOPHILS # BLD AUTO: 0.06 10*3/MM3 (ref 0–0.2)
BASOPHILS NFR BLD AUTO: 1.2 % (ref 0–1.5)
BENZODIAZ UR QL SCN: NEGATIVE
BILIRUB SERPL-MCNC: 0.4 MG/DL (ref 0–1.2)
BILIRUB UR QL STRIP: NEGATIVE
BUN SERPL-MCNC: 8 MG/DL (ref 8–23)
BUN/CREAT SERPL: 10.4 (ref 7–25)
BUPRENORPHINE SERPL-MCNC: NEGATIVE NG/ML
CALCIUM SPEC-SCNC: 9.9 MG/DL (ref 8.6–10.5)
CANNABINOIDS SERPL QL: NEGATIVE
CHLORIDE SERPL-SCNC: 108 MMOL/L (ref 98–107)
CLARITY UR: CLEAR
CO2 SERPL-SCNC: 19.3 MMOL/L (ref 22–29)
COCAINE UR QL: NEGATIVE
COLOR UR: YELLOW
CREAT SERPL-MCNC: 0.77 MG/DL (ref 0.57–1)
DEPRECATED RDW RBC AUTO: 54.4 FL (ref 37–54)
EGFRCR SERPLBLD CKD-EPI 2021: 87.3 ML/MIN/1.73
EOSINOPHIL # BLD AUTO: 0.11 10*3/MM3 (ref 0–0.4)
EOSINOPHIL NFR BLD AUTO: 2.2 % (ref 0.3–6.2)
ERYTHROCYTE [DISTWIDTH] IN BLOOD BY AUTOMATED COUNT: 17.1 % (ref 12.3–15.4)
ETHANOL BLD-MCNC: <10 MG/DL (ref 0–10)
ETHANOL UR QL: <0.01 %
GLOBULIN UR ELPH-MCNC: 2.8 GM/DL
GLUCOSE SERPL-MCNC: 87 MG/DL (ref 65–99)
GLUCOSE UR STRIP-MCNC: NEGATIVE MG/DL
HCT VFR BLD AUTO: 41.2 % (ref 34–46.6)
HGB BLD-MCNC: 12.8 G/DL (ref 12–15.9)
HGB UR QL STRIP.AUTO: NEGATIVE
IMM GRANULOCYTES # BLD AUTO: 0.01 10*3/MM3 (ref 0–0.05)
IMM GRANULOCYTES NFR BLD AUTO: 0.2 % (ref 0–0.5)
KETONES UR QL STRIP: NEGATIVE
LEUKOCYTE ESTERASE UR QL STRIP.AUTO: NEGATIVE
LYMPHOCYTES # BLD AUTO: 1.87 10*3/MM3 (ref 0.7–3.1)
LYMPHOCYTES NFR BLD AUTO: 37.6 % (ref 19.6–45.3)
MCH RBC QN AUTO: 27.3 PG (ref 26.6–33)
MCHC RBC AUTO-ENTMCNC: 31.1 G/DL (ref 31.5–35.7)
MCV RBC AUTO: 87.8 FL (ref 79–97)
METHADONE UR QL SCN: NEGATIVE
MONOCYTES # BLD AUTO: 0.55 10*3/MM3 (ref 0.1–0.9)
MONOCYTES NFR BLD AUTO: 11 % (ref 5–12)
NEUTROPHILS NFR BLD AUTO: 2.38 10*3/MM3 (ref 1.7–7)
NEUTROPHILS NFR BLD AUTO: 47.8 % (ref 42.7–76)
NITRITE UR QL STRIP: NEGATIVE
NRBC BLD AUTO-RTO: 0 /100 WBC (ref 0–0.2)
OPIATES UR QL: NEGATIVE
OXYCODONE UR QL SCN: NEGATIVE
PCP UR QL SCN: NEGATIVE
PH UR STRIP.AUTO: 6 [PH] (ref 4.5–8)
PLATELET # BLD AUTO: 200 10*3/MM3 (ref 140–450)
PMV BLD AUTO: 11.3 FL (ref 6–12)
POTASSIUM SERPL-SCNC: 4.3 MMOL/L (ref 3.5–5.2)
PROT SERPL-MCNC: 7.4 G/DL (ref 6–8.5)
PROT UR QL STRIP: NEGATIVE
RBC # BLD AUTO: 4.69 10*6/MM3 (ref 3.77–5.28)
SODIUM SERPL-SCNC: 141 MMOL/L (ref 136–145)
SP GR UR STRIP: <=1.005 (ref 1–1.03)
TRICYCLICS UR QL SCN: NEGATIVE
UROBILINOGEN UR QL STRIP: NORMAL
WBC NRBC COR # BLD AUTO: 4.98 10*3/MM3 (ref 3.4–10.8)

## 2024-06-28 PROCEDURE — 80306 DRUG TEST PRSMV INSTRMNT: CPT | Performed by: EMERGENCY MEDICINE

## 2024-06-28 PROCEDURE — 82077 ASSAY SPEC XCP UR&BREATH IA: CPT | Performed by: EMERGENCY MEDICINE

## 2024-06-28 PROCEDURE — 80053 COMPREHEN METABOLIC PANEL: CPT | Performed by: EMERGENCY MEDICINE

## 2024-06-28 PROCEDURE — 85025 COMPLETE CBC W/AUTO DIFF WBC: CPT | Performed by: EMERGENCY MEDICINE

## 2024-06-28 PROCEDURE — 36415 COLL VENOUS BLD VENIPUNCTURE: CPT

## 2024-06-28 PROCEDURE — 81003 URINALYSIS AUTO W/O SCOPE: CPT | Performed by: EMERGENCY MEDICINE

## 2024-06-28 PROCEDURE — 99285 EMERGENCY DEPT VISIT HI MDM: CPT

## 2024-06-28 NOTE — ED PROVIDER NOTES
"Subjective     History provided by:  Patient    History of Present Illness    Chief complaint: \"Feel out of control\" and depressed.    Location: Psych    Quality/Severity: The patient states she feels \"out of control\" and feels depressed.  She states she has thoughts of hurting herself by overdose.  She denies wanting to hurt anybody else.    Timing/Onset: \"Several weeks\"    Modifying Factors: Patient has a history of depression.  Her psychiatrist has recently been adjusting her medicines without success.    Associated symptoms: Depression and anxiety.    Narrative: The patient is a 63-year-old white female complaining of feeling \"out of control\" and states that she feels like she is \"losing my mind\".  She states she wants to just \"throw in the towel\".  She states she has thoughts of hurting herself by overdosing.  She does have previous history of overdoses.  She does have a previous history of psychiatric hospitalizations.  She is under the care of a psychiatrist Meliza Palm with [facility] who has been recently trying to adjust her medications without success.  Her psychiatrist told her to come to the ER today.    Review of Systems  Past Medical History:   Diagnosis Date    Anemia 2023    Colon polyp 2023    Depression     Diabetes mellitus, type II     History of PCOS     History of shingles 2005    Hyperlipidemia     Hypertension     Migraine     Pap smear of cervix with ASCUS, cannot exclude HGSIL 11/05/2007 11/5/07; 1/8/09; 7/9/09     /78   Pulse 79   Temp 98 °F (36.7 °C) (Oral)   Resp 16   Ht 160 cm (63\")   Wt 68 kg (150 lb)   SpO2 99%   BMI 26.57 kg/m²     Past Medical History:   Diagnosis Date    Anemia 2023    Colon polyp 2023    Depression     Diabetes mellitus, type II     History of PCOS     History of shingles 2005    Hyperlipidemia     Hypertension     Migraine     Pap smear of cervix with ASCUS, cannot exclude HGSIL 11/05/2007 11/5/07; 1/8/09; 7/9/09       Allergies "   Allergen Reactions    Bupropion Other (See Comments) and Diarrhea       Past Surgical History:   Procedure Laterality Date    ABLATION COLPOCLESIS N/A     does not recall date    BARIATRIC SURGERY  2000    Gastric bypass    COLONOSCOPY  2023    COLONOSCOPY N/A 02/01/2024    Procedure: COLONOSCOPY WITH BIOPSY;  Surgeon: Manolo Hurley MD;  Location: [facility];  Service: Gastroenterology;  Laterality: N/A;  random right colon biopsy  r/o microscopic colitis  random left colon biopsy    r/o microscopic colitis  external hemorrhoids    D & C HYSTEROSCOPY ENDOMETRIAL ABLATION  06/01/2010    W/KATHIE. DR. DAVID HERNDON/JUAN DANIEL.    ENDOMETRIAL BIOPSY  12/03/2007    BENIGN    GASTROTOMY  05/2004    HYSTERECTOMY      pt denies having hysterectomy   HAD ABLATION    TONSILLECTOMY  1967    TUBAL ABDOMINAL LIGATION  1986    UPPER GASTROINTESTINAL ENDOSCOPY  2023       Family History   Problem Relation Age of Onset    Breast cancer Mother 72    Stroke Mother     Hypertension Mother     Heart disease Mother     No Known Problems Father        Social History     Socioeconomic History    Marital status:      Spouse name: GOSIA    Number of children: 2   Tobacco Use    Smoking status: Never    Smokeless tobacco: Never   Vaping Use    Vaping status: Never Used   Substance and Sexual Activity    Alcohol use: No    Drug use: Never    Sexual activity: Yes     Partners: Male     Birth control/protection: Surgical     Comment: BTL 1986           Objective   Physical Exam  Vitals and nursing note reviewed.   Constitutional:       General: She is not in acute distress.     Appearance: She is well-developed. She is not diaphoretic.   HENT:      Head: Normocephalic and atraumatic.      Nose: Nose normal.      Mouth/Throat:      Mouth: Mucous membranes are moist.      Pharynx: Oropharynx is clear.   Eyes:      General: No scleral icterus.        Right eye: No discharge.         Left eye: No discharge.      Conjunctiva/sclera:  Conjunctivae normal.      Pupils: Pupils are equal, round, and reactive to light.   Neck:      Thyroid: No thyromegaly.      Vascular: No JVD.   Cardiovascular:      Rate and Rhythm: Normal rate and regular rhythm.      Heart sounds: Murmur (2/6 systolic) heard.      Comments: Occasional skipped beat.  Pulmonary:      Effort: Pulmonary effort is normal.      Breath sounds: Normal breath sounds. No wheezing, rhonchi or rales.   Chest:      Chest wall: No tenderness.   Abdominal:      General: Bowel sounds are normal. There is no distension.      Palpations: Abdomen is soft.      Tenderness: There is no abdominal tenderness. There is no guarding.   Musculoskeletal:         General: No tenderness, deformity or signs of injury. Normal range of motion.      Cervical back: Normal range of motion and neck supple.      Left lower leg: No edema.   Lymphadenopathy:      Cervical: No cervical adenopathy.   Skin:     General: Skin is warm and dry.      Capillary Refill: Capillary refill takes less than 2 seconds.      Coloration: Skin is not pale.      Findings: No erythema or rash.   Neurological:      General: No focal deficit present.      Mental Status: She is alert and oriented to person, place, and time.      Cranial Nerves: No cranial nerve deficit.      Coordination: Coordination normal.      Comments: No focal motor sensory deficit   Psychiatric:         Mood and Affect: Mood normal.         Behavior: Behavior normal.         Thought Content: Thought content normal.         Judgment: Judgment normal.         Procedures           ED Course  ED Course as of 06/28/24 2321 Fri Jun 28, 2024 1923 The patient was placed on 72-hour hold. [TP]   1923 Review the patient's laboratory studies: The patient's urine tox screen is negative.  Serum ethanol level was 0.  CMP essentially had normal electrolytes, normal renal function test and normal liver enzymes.  Urinalysis was negative for blood or infection.  CBC had a normal  white count of 5 with a normal differential.  Hemoglobin, hematocrit and platelets within normal limits. [TP]   1923 Time team consulted. [TP]   2119 The patient was accepted by Dr. Carmona for admission to the [facility]. [TP]      ED Course User Index  [TP] Raheel Hoff MD                                             Medical Decision Making  Problems Addressed:  Severe episode of recurrent major depressive disorder, without psychotic features: complicated acute illness or injury  Suicidal ideation: complicated acute illness or injury    Amount and/or Complexity of Data Reviewed  Labs: ordered. Decision-making details documented in ED Course.    Risk  Decision regarding hospitalization.        Final diagnoses:   Suicidal ideation   Severe episode of recurrent major depressive disorder, without psychotic features       ED Disposition  ED Disposition       ED Disposition   Transfer to Another Facility     Condition   --    Comment   --               No follow-up provider specified.       Medication List        ASK your doctor about these medications      metFORMIN 1000 MG tablet  Commonly known as: GLUCOPHAGE  Ask about: Which instructions should I use?     pioglitazone 30 MG tablet  Commonly known as: ACTOS  Ask about: Which instructions should I use?                Labs Reviewed   COMPREHENSIVE METABOLIC PANEL - Abnormal; Notable for the following components:       Result Value    Chloride 108 (*)     CO2 19.3 (*)     All other components within normal limits    Narrative:     GFR Normal >60  Chronic Kidney Disease <60  Kidney Failure <15     CBC WITH AUTO DIFFERENTIAL - Abnormal; Notable for the following components:    MCHC 31.1 (*)     RDW 17.1 (*)     RDW-SD 54.4 (*)     All other components within normal limits   URINALYSIS W/ MICROSCOPIC IF INDICATED (NO CULTURE) - Normal    Narrative:     Urine microscopic not indicated.   URINE DRUG SCREEN - Normal    Narrative:     Cutoff For Drugs Screened:    Amphetamines                500 ng/ml  Barbiturates               200 ng/ml  Benzodiazepines            150 ng/ml  Cocaine                    150 ng/ml  Methadone                  200 ng/ml  Opiates                    100 ng/ml  Phencyclidine               25 ng/ml  THC                         50 ng/ml  Methamphetamine            500 ng/ml  Tricyclic Antidepressants  300 ng/ml  Oxycodone                  100 ng/ml  Buprenorphine               10 ng/ml    The normal value for all drugs tested is negative. This report includes unconfirmed screening results, with the cutoff values listed, to be used for medical treatment purposes only.  Unconfirmed results must not be used for non-medical purposes such as employment or legal testing.  Clinical consideration should be applied to any drug of abuse test, particularly when unconfirmed results are used.     ETHANOL   CBC AND DIFFERENTIAL    Narrative:     The following orders were created for panel order CBC & Differential.  Procedure                               Abnormality         Status                     ---------                               -----------         ------                     CBC Auto Differential[[id number]]        Abnormal            Final result                 Please view results for these tests on the individual orders.     No orders to display          Medication List        ASK your doctor about these medications      metFORMIN 1000 MG tablet  Commonly known as: GLUCOPHAGE  Ask about: Which instructions should I use?     pioglitazone 30 MG tablet  Commonly known as: ACTOS  Ask about: Which instructions should I use?                   Raheel Hoff MD  06/28/24 1353

## 2024-06-28 NOTE — ED NOTES
"Daughter of pt brought her to ER for \" feeling out of control\" for several weeks. Has had a medication change and recently Rx Vraylar due to \" other meds not working\". Meliza Palm, psychiatrist sent her here  "

## 2024-06-28 NOTE — ED NOTES
Called [facility] pharmacy to get updated list of medicine due to pt not having current list and knowing names of some meds

## 2024-10-07 ENCOUNTER — TRANSCRIBE ORDERS (OUTPATIENT)
Dept: ADMINISTRATIVE | Facility: HOSPITAL | Age: 64
End: 2024-10-07
Payer: COMMERCIAL

## 2024-10-07 DIAGNOSIS — G44.89 CHRONIC MIXED HEADACHE SYNDROME: Primary | ICD-10-CM

## 2024-10-23 ENCOUNTER — HOSPITAL ENCOUNTER (OUTPATIENT)
Dept: MRI IMAGING | Facility: HOSPITAL | Age: 64
Discharge: HOME | End: 2024-10-23
Admitting: INTERNAL MEDICINE
Payer: COMMERCIAL

## 2024-10-23 DIAGNOSIS — G44.89 CHRONIC MIXED HEADACHE SYNDROME: ICD-10-CM

## 2024-10-23 PROCEDURE — 70553 MRI BRAIN STEM W/O & W/DYE: CPT

## 2024-10-23 PROCEDURE — 0 GADOBENATE DIMEGLUMINE 529 MG/ML SOLUTION: Performed by: INTERNAL MEDICINE

## 2024-10-23 PROCEDURE — A9577 INJ MULTIHANCE: HCPCS | Performed by: INTERNAL MEDICINE

## 2024-10-23 RX ADMIN — GADOBENATE DIMEGLUMINE 15 ML: 529 INJECTION, SOLUTION INTRAVENOUS at 13:26

## 2025-03-09 ENCOUNTER — TELEPHONE (OUTPATIENT)
Dept: URGENT CARE | Facility: CLINIC | Age: 65
End: 2025-03-09
Payer: COMMERCIAL

## 2025-03-09 DIAGNOSIS — N30.01 ACUTE CYSTITIS WITH HEMATURIA: ICD-10-CM

## 2025-03-09 RX ORDER — NITROFURANTOIN 25; 75 MG/1; MG/1
100 CAPSULE ORAL 2 TIMES DAILY
Qty: 4 CAPSULE | Refills: 0 | Status: SHIPPED | OUTPATIENT
Start: 2025-03-09 | End: 2025-03-11

## 2025-07-24 ENCOUNTER — HOSPITAL ENCOUNTER (EMERGENCY)
Facility: HOSPITAL | Age: 65
Discharge: HOME | End: 2025-07-24
Attending: STUDENT IN AN ORGANIZED HEALTH CARE EDUCATION/TRAINING PROGRAM
Payer: COMMERCIAL

## 2025-07-24 ENCOUNTER — APPOINTMENT (OUTPATIENT)
Dept: GENERAL RADIOLOGY | Facility: HOSPITAL | Age: 65
End: 2025-07-24
Payer: COMMERCIAL

## 2025-07-24 VITALS
WEIGHT: 165 LBS | HEART RATE: 71 BPM | BODY MASS INDEX: 29.23 KG/M2 | RESPIRATION RATE: 18 BRPM | DIASTOLIC BLOOD PRESSURE: 63 MMHG | HEIGHT: 63 IN | OXYGEN SATURATION: 99 % | TEMPERATURE: 97.9 F | SYSTOLIC BLOOD PRESSURE: 119 MMHG

## 2025-07-24 DIAGNOSIS — R07.89 CHEST WALL PAIN: Primary | ICD-10-CM

## 2025-07-24 LAB
ALBUMIN SERPL-MCNC: 4 G/DL (ref 3.5–5.2)
ALBUMIN/GLOB SERPL: 1.7 G/DL
ALP SERPL-CCNC: 107 U/L (ref 39–117)
ALT SERPL W P-5'-P-CCNC: 22 U/L (ref 1–33)
ANION GAP SERPL CALCULATED.3IONS-SCNC: 8.3 MMOL/L (ref 5–15)
AST SERPL-CCNC: 29 U/L (ref 1–32)
BASOPHILS # BLD AUTO: 0.05 10*3/MM3 (ref 0–0.2)
BASOPHILS NFR BLD AUTO: 1 % (ref 0–1.5)
BILIRUB SERPL-MCNC: 0.3 MG/DL (ref 0–1.2)
BUN SERPL-MCNC: 7.2 MG/DL (ref 8–23)
BUN/CREAT SERPL: 8.8 (ref 7–25)
CALCIUM SPEC-SCNC: 8.9 MG/DL (ref 8.6–10.5)
CHLORIDE SERPL-SCNC: 107 MMOL/L (ref 98–107)
CO2 SERPL-SCNC: 19.7 MMOL/L (ref 22–29)
CREAT SERPL-MCNC: 0.82 MG/DL (ref 0.57–1)
DEPRECATED RDW RBC AUTO: 45.3 FL (ref 37–54)
EGFRCR SERPLBLD CKD-EPI 2021: 80.5 ML/MIN/1.73
EOSINOPHIL # BLD AUTO: 0.06 10*3/MM3 (ref 0–0.4)
EOSINOPHIL NFR BLD AUTO: 1.2 % (ref 0.3–6.2)
ERYTHROCYTE [DISTWIDTH] IN BLOOD BY AUTOMATED COUNT: 13.2 % (ref 12.3–15.4)
GEN 5 1HR TROPONIN T REFLEX: 14 NG/L
GLOBULIN UR ELPH-MCNC: 2.4 GM/DL
GLUCOSE SERPL-MCNC: 178 MG/DL (ref 65–99)
HCT VFR BLD AUTO: 40.3 % (ref 34–46.6)
HGB BLD-MCNC: 13.3 G/DL (ref 12–15.9)
HOLD SPECIMEN: NORMAL
HOLD SPECIMEN: NORMAL
IMM GRANULOCYTES # BLD AUTO: 0.04 10*3/MM3 (ref 0–0.05)
IMM GRANULOCYTES NFR BLD AUTO: 0.8 % (ref 0–0.5)
LYMPHOCYTES # BLD AUTO: 1.77 10*3/MM3 (ref 0.7–3.1)
LYMPHOCYTES NFR BLD AUTO: 34.8 % (ref 19.6–45.3)
MCH RBC QN AUTO: 30.9 PG (ref 26.6–33)
MCHC RBC AUTO-ENTMCNC: 33 G/DL (ref 31.5–35.7)
MCV RBC AUTO: 93.7 FL (ref 79–97)
MONOCYTES # BLD AUTO: 0.43 10*3/MM3 (ref 0.1–0.9)
MONOCYTES NFR BLD AUTO: 8.5 % (ref 5–12)
NEUTROPHILS NFR BLD AUTO: 2.73 10*3/MM3 (ref 1.7–7)
NEUTROPHILS NFR BLD AUTO: 53.7 % (ref 42.7–76)
NRBC BLD AUTO-RTO: 0 /100 WBC (ref 0–0.2)
PLATELET # BLD AUTO: 184 10*3/MM3 (ref 140–450)
PMV BLD AUTO: 11.1 FL (ref 6–12)
POTASSIUM SERPL-SCNC: 3.9 MMOL/L (ref 3.5–5.2)
PROT SERPL-MCNC: 6.4 G/DL (ref 6–8.5)
QT INTERVAL: 384 MS
QTC INTERVAL: 406 MS
RBC # BLD AUTO: 4.3 10*6/MM3 (ref 3.77–5.28)
SODIUM SERPL-SCNC: 135 MMOL/L (ref 136–145)
TROPONIN T % DELTA: -7
TROPONIN T NUMERIC DELTA: -1 NG/L
TROPONIN T SERPL HS-MCNC: 15 NG/L
WBC NRBC COR # BLD AUTO: 5.08 10*3/MM3 (ref 3.4–10.8)
WHOLE BLOOD HOLD COAG: NORMAL
WHOLE BLOOD HOLD SPECIMEN: NORMAL

## 2025-07-24 PROCEDURE — 80053 COMPREHEN METABOLIC PANEL: CPT | Performed by: STUDENT IN AN ORGANIZED HEALTH CARE EDUCATION/TRAINING PROGRAM

## 2025-07-24 PROCEDURE — 96374 THER/PROPH/DIAG INJ IV PUSH: CPT

## 2025-07-24 PROCEDURE — 71045 X-RAY EXAM CHEST 1 VIEW: CPT

## 2025-07-24 PROCEDURE — 25010000002 KETOROLAC TROMETHAMINE PER 15 MG: Performed by: STUDENT IN AN ORGANIZED HEALTH CARE EDUCATION/TRAINING PROGRAM

## 2025-07-24 PROCEDURE — 99284 EMERGENCY DEPT VISIT MOD MDM: CPT | Performed by: STUDENT IN AN ORGANIZED HEALTH CARE EDUCATION/TRAINING PROGRAM

## 2025-07-24 PROCEDURE — 85025 COMPLETE CBC W/AUTO DIFF WBC: CPT | Performed by: STUDENT IN AN ORGANIZED HEALTH CARE EDUCATION/TRAINING PROGRAM

## 2025-07-24 PROCEDURE — 93010 ELECTROCARDIOGRAM REPORT: CPT | Performed by: INTERNAL MEDICINE

## 2025-07-24 PROCEDURE — 93005 ELECTROCARDIOGRAM TRACING: CPT | Performed by: STUDENT IN AN ORGANIZED HEALTH CARE EDUCATION/TRAINING PROGRAM

## 2025-07-24 PROCEDURE — 84484 ASSAY OF TROPONIN QUANT: CPT | Performed by: STUDENT IN AN ORGANIZED HEALTH CARE EDUCATION/TRAINING PROGRAM

## 2025-07-24 RX ORDER — SODIUM CHLORIDE 0.9 % (FLUSH) 0.9 %
10 SYRINGE (ML) INJECTION AS NEEDED
Status: DISCONTINUED | OUTPATIENT
Start: 2025-07-24 | End: 2025-07-24 | Stop reason: HOSPADM

## 2025-07-24 RX ORDER — ASPIRIN 81 MG/1
324 TABLET, CHEWABLE ORAL ONCE
Status: COMPLETED | OUTPATIENT
Start: 2025-07-24 | End: 2025-07-24

## 2025-07-24 RX ORDER — KETOROLAC TROMETHAMINE 30 MG/ML
15 INJECTION, SOLUTION INTRAMUSCULAR; INTRAVENOUS ONCE
Status: COMPLETED | OUTPATIENT
Start: 2025-07-24 | End: 2025-07-24

## 2025-07-24 RX ADMIN — KETOROLAC TROMETHAMINE 15 MG: 30 INJECTION INTRAMUSCULAR; INTRAVENOUS at 16:52

## 2025-07-24 RX ADMIN — ASPIRIN 81 MG CHEWABLE TABLET 324 MG: 81 TABLET CHEWABLE at 15:09

## 2025-07-24 NOTE — ED PROVIDER NOTES
Subjective   History of Present Illness  64-year-old female with past medical history of diabetes, hyperlipidemia, hypertension, presents the emergency department with chest wall pain.  Patient states that her chest is sore when she pushes against it.  She denies any radiating pain.  She also denies any nausea or vomiting associate with her symptoms.  She also denies any history of MI.        Review of Systems   All other systems reviewed and are negative.      Past Medical History:   Diagnosis Date    Anemia 2023    Colon polyp 2023    Depression     Diabetes mellitus, type II     History of PCOS     History of shingles 2005    Hyperlipidemia     Hypertension     Migraine     Pap smear of cervix with ASCUS, cannot exclude HGSIL 11/05/2007 11/5/07; 1/8/09; 7/9/09       Allergies   Allergen Reactions    Bupropion Diarrhea, Other (See Comments) and Seizure       Past Surgical History:   Procedure Laterality Date    ABLATION COLPOCLESIS N/A     does not recall date    BARIATRIC SURGERY  2000    Gastric bypass    COLONOSCOPY  2023    COLONOSCOPY N/A 02/01/2024    Procedure: COLONOSCOPY WITH BIOPSY;  Surgeon: Manolo Hurley MD;  Location: [facility];  Service: Gastroenterology;  Laterality: N/A;  random right colon biopsy  r/o microscopic colitis  random left colon biopsy    r/o microscopic colitis  external hemorrhoids    D & C HYSTEROSCOPY ENDOMETRIAL ABLATION  06/01/2010    W/KATHIE. DR. DAVID HERNDON/[facility].    ENDOMETRIAL BIOPSY  12/03/2007    BENIGN    GASTROTOMY  05/2004    HYSTERECTOMY      pt denies having hysterectomy   HAD ABLATION    TONSILLECTOMY  1967    TUBAL ABDOMINAL LIGATION  1986    UPPER GASTROINTESTINAL ENDOSCOPY  2023       Family History   Problem Relation Age of Onset    Breast cancer Mother 72    Stroke Mother     Hypertension Mother     Heart disease Mother     No Known Problems Father        Social History     Socioeconomic History    Marital status:      Spouse name: GOSIA Walker of  children: 2   Tobacco Use    Smoking status: Never     Passive exposure: Never    Smokeless tobacco: Never   Vaping Use    Vaping status: Never Used   Substance and Sexual Activity    Alcohol use: No    Drug use: Never    Sexual activity: Yes     Partners: Male     Birth control/protection: Surgical     Comment: BTL 1986           Objective   Physical Exam  Constitutional:       Appearance: Normal appearance.   HENT:      Head: Normocephalic.      Mouth/Throat:      Mouth: Mucous membranes are moist.   Eyes:      Pupils: Pupils are equal, round, and reactive to light.   Cardiovascular:      Rate and Rhythm: Normal rate and regular rhythm.   Pulmonary:      Effort: Pulmonary effort is normal.   Abdominal:      Palpations: Abdomen is soft.   Musculoskeletal:         General: Normal range of motion.      Cervical back: Normal range of motion.   Neurological:      General: No focal deficit present.      Mental Status: She is alert.   Psychiatric:         Mood and Affect: Mood normal.         Procedures           ED Course                  Heart score 4   Shared Decision Making  I discussed the findings with the patient/patient representative who is in agreement with the treatment plan and the final disposition.  Risks and benefits of discharge and/or observation/admission were discussed: Yes                                      Medical Decision Making  64-year-old female with past medical history of diabetes, hyperlipidemia, hypertension, presents the emergency department with chest wall pain.  Patient states that her chest is sore when she pushes against it.  She denies any radiating pain.  She also denies any nausea or vomiting associate with her symptoms.  She also denies any history of MI.  Vital signs within normal limits upon arrival.  EKG independently interpreted by me shows no signs of acute ischemia.  2 sets of troponins performed and minimally elevated at 15 and 14.  All other labs within normal limits.   Patient treated with aspirin here in the emergency department.  Upon my reevaluation patient states that her symptoms have improved.  She still states that she has some pain with palpation of her chest wall therefore she was given Toradol.  Patient does have a heart score of 4.  She was offered admission to the hospital, but she stated that she felt comfortable going home and returning if she has any worsening symptoms.  She states that she has outpatient follow-up.  Upon reexamination patient currently in no acute distress, nontoxic-appearing, stable condition.  Patient advised to return the emergency room for any worsening symptoms.  Patient understands and agrees with our plan for discharge.    Problems Addressed:  Chest wall pain: complicated acute illness or injury    Amount and/or Complexity of Data Reviewed  Labs: ordered.  Radiology: ordered and independent interpretation performed.     Details: No acute cardiopulmonary process.  ECG/medicine tests: ordered and independent interpretation performed.     Details: EKG independently interpreted by me  Rate 67  AL interval 137  QTc 406  Sinus rhythm  No STEMI  Electronically signed by Izaiah Jensen DO, 07/24/25, 4:48 PM EDT.      Risk  OTC drugs.  Prescription drug management.        Final diagnoses:   Chest wall pain       ED Disposition  ED Disposition       ED Disposition   Discharge    Condition   Stable    Comment   --               Chris Rossi MD  [address] [facility] [address]  [phone]    In 2 days           Medication List      No changes were made to your prescriptions during this visit.            Izaiah Jensen DO  07/24/25 [id number]

## (undated) DEVICE — VIAL FORMALIN CAP 10P 40ML

## (undated) DEVICE — BW-412T DISP COMBO CLEANING BRUSH: Brand: SINGLE USE COMBINATION CLEANING BRUSH

## (undated) DEVICE — ADAPT CLN BIOGUARD AIR/H2O DISP

## (undated) DEVICE — Device

## (undated) DEVICE — KT ORCA ORCAPOD DISP STRL

## (undated) DEVICE — SOL IRR H2O BTL 1000ML STRL

## (undated) DEVICE — FRCP BX RADJAW4 NDL 2.8 240CM LG OG BX40

## (undated) DEVICE — SYR LL W/SCALE/MARK 3ML STRL

## (undated) DEVICE — LINER SURG CANSTR SXN S/RIGD 1500CC